# Patient Record
Sex: MALE | Race: WHITE | NOT HISPANIC OR LATINO | Employment: OTHER
[De-identification: names, ages, dates, MRNs, and addresses within clinical notes are randomized per-mention and may not be internally consistent; named-entity substitution may affect disease eponyms.]

---

## 2017-08-15 ENCOUNTER — RECORDS - HEALTHEAST (OUTPATIENT)
Dept: ADMINISTRATIVE | Facility: OTHER | Age: 62
End: 2017-08-15

## 2017-08-17 RX ORDER — UBIDECARENONE 50 MG
100 CAPSULE ORAL DAILY
Status: SHIPPED | COMMUNITY
Start: 2017-08-17 | End: 2023-02-03

## 2017-08-17 RX ORDER — MULTIVIT WITH MINERALS/LUTEIN
1000 TABLET ORAL DAILY
Status: SHIPPED | COMMUNITY
Start: 2017-08-17 | End: 2023-02-03

## 2017-08-17 RX ORDER — ASCORBIC ACID 500 MG
500 TABLET ORAL DAILY
Status: SHIPPED | COMMUNITY
Start: 2017-08-17

## 2017-08-22 ENCOUNTER — HOSPITAL ENCOUNTER (OUTPATIENT)
Dept: NUCLEAR MEDICINE | Facility: CLINIC | Age: 62
Discharge: HOME | End: 2017-08-22
Attending: FAMILY MEDICINE

## 2017-08-22 ENCOUNTER — HOSPITAL ENCOUNTER (OUTPATIENT)
Dept: CARDIOLOGY | Facility: CLINIC | Age: 62
Discharge: HOME | End: 2017-08-22
Attending: FAMILY MEDICINE

## 2017-08-22 ENCOUNTER — COMMUNICATION - HEALTHEAST (OUTPATIENT)
Dept: TELEHEALTH | Facility: CLINIC | Age: 62
End: 2017-08-22

## 2017-08-22 DIAGNOSIS — R94.31 ABNORMAL EKG: ICD-10-CM

## 2017-08-22 LAB
CV STRESS CURRENT BP HE: NORMAL
CV STRESS CURRENT HR HE: 100
CV STRESS CURRENT HR HE: 108
CV STRESS CURRENT HR HE: 108
CV STRESS CURRENT HR HE: 55
CV STRESS CURRENT HR HE: 58
CV STRESS CURRENT HR HE: 60
CV STRESS CURRENT HR HE: 60
CV STRESS CURRENT HR HE: 61
CV STRESS CURRENT HR HE: 62
CV STRESS CURRENT HR HE: 63
CV STRESS CURRENT HR HE: 63
CV STRESS CURRENT HR HE: 65
CV STRESS CURRENT HR HE: 69
CV STRESS CURRENT HR HE: 84
CV STRESS CURRENT HR HE: 87
CV STRESS CURRENT HR HE: 88
CV STRESS CURRENT HR HE: 88
CV STRESS CURRENT HR HE: 93
CV STRESS CURRENT HR HE: 95
CV STRESS DEVIATION TIME HE: NORMAL
CV STRESS ECHO PERCENT HR HE: NORMAL
CV STRESS EXERCISE STAGE HE: NORMAL
CV STRESS FINAL RESTING BP HE: NORMAL
CV STRESS FINAL RESTING HR HE: 62
CV STRESS MAX HR HE: 107
CV STRESS MAX TREADMILL GRADE HE: 0
CV STRESS MAX TREADMILL SPEED HE: 1
CV STRESS PEAK DIA BP HE: NORMAL
CV STRESS PEAK SYS BP HE: NORMAL
CV STRESS PHASE HE: NORMAL
CV STRESS PROTOCOL HE: NORMAL
CV STRESS RESTING PT POSITION HE: NORMAL
CV STRESS RESTING PT POSITION HE: NORMAL
CV STRESS ST DEVIATION AMOUNT HE: NORMAL
CV STRESS ST DEVIATION ELEVATION HE: NORMAL
CV STRESS ST EVELATION AMOUNT HE: NORMAL
CV STRESS TEST TYPE HE: NORMAL
CV STRESS TOTAL STAGE TIME MIN 1 HE: NORMAL
NUC STRESS EJECTION FRACTION: 55 %
STRESS ECHO BASELINE BP: NORMAL
STRESS ECHO BASELINE HR: 55
STRESS ECHO CALCULATED PERCENT HR: 67 %
STRESS ECHO LAST STRESS BP: NORMAL
STRESS ECHO LAST STRESS HR: 88

## 2018-04-16 ASSESSMENT — MIFFLIN-ST. JEOR
SCORE: 1939.31
SCORE: 1939.31

## 2018-04-17 ENCOUNTER — SURGERY - HEALTHEAST (OUTPATIENT)
Dept: CARDIOLOGY | Facility: CLINIC | Age: 63
End: 2018-04-17

## 2018-04-17 ASSESSMENT — MIFFLIN-ST. JEOR
SCORE: 1921.62
SCORE: 1921.62
SCORE: 1921.17
SCORE: 1921.17

## 2018-04-18 ASSESSMENT — MIFFLIN-ST. JEOR
SCORE: 1897.58
SCORE: 1897.58

## 2018-04-19 ASSESSMENT — MIFFLIN-ST. JEOR
SCORE: 1895.76
SCORE: 1895.76

## 2018-04-20 ENCOUNTER — ANESTHESIA - HEALTHEAST (OUTPATIENT)
Dept: SURGERY | Facility: CLINIC | Age: 63
End: 2018-04-20

## 2018-04-20 ASSESSMENT — MIFFLIN-ST. JEOR
SCORE: 1892.14
SCORE: 1892.14

## 2018-04-21 ASSESSMENT — MIFFLIN-ST. JEOR
SCORE: 1897.12
SCORE: 1897.12

## 2018-04-22 ASSESSMENT — MIFFLIN-ST. JEOR
SCORE: 1890.32
SCORE: 1890.32

## 2018-04-23 ENCOUNTER — SURGERY - HEALTHEAST (OUTPATIENT)
Dept: SURGERY | Facility: CLINIC | Age: 63
End: 2018-04-23

## 2018-04-23 ASSESSMENT — MIFFLIN-ST. JEOR
SCORE: 1892.59
SCORE: 1892.59

## 2018-04-24 ASSESSMENT — MIFFLIN-ST. JEOR
SCORE: 1967.89
SCORE: 1967.89

## 2018-04-25 ENCOUNTER — AMBULATORY - HEALTHEAST (OUTPATIENT)
Dept: CARDIAC REHAB | Facility: CLINIC | Age: 63
End: 2018-04-25

## 2018-04-25 DIAGNOSIS — Z95.1 S/P CABG X 5: ICD-10-CM

## 2018-04-25 ASSESSMENT — MIFFLIN-ST. JEOR
SCORE: 1980.13
SCORE: 1980.13

## 2018-04-26 ASSESSMENT — MIFFLIN-ST. JEOR
SCORE: 1942.03
SCORE: 1942.03

## 2018-04-27 ASSESSMENT — MIFFLIN-ST. JEOR
SCORE: 1920.26
SCORE: 1920.26

## 2018-04-30 ENCOUNTER — OFFICE VISIT - HEALTHEAST (OUTPATIENT)
Dept: GERIATRICS | Facility: CLINIC | Age: 63
End: 2018-04-30

## 2018-04-30 DIAGNOSIS — I21.4 NSTEMI (NON-ST ELEVATED MYOCARDIAL INFARCTION) (H): ICD-10-CM

## 2018-04-30 DIAGNOSIS — D62 ACUTE BLOOD LOSS ANEMIA: ICD-10-CM

## 2018-04-30 DIAGNOSIS — I25.110 CORONARY ARTERY DISEASE INVOLVING NATIVE CORONARY ARTERY OF NATIVE HEART WITH UNSTABLE ANGINA PECTORIS (H): ICD-10-CM

## 2018-05-03 ENCOUNTER — RECORDS - HEALTHEAST (OUTPATIENT)
Dept: LAB | Facility: CLINIC | Age: 63
End: 2018-05-03

## 2018-05-03 ENCOUNTER — AMBULATORY - HEALTHEAST (OUTPATIENT)
Dept: GERIATRICS | Facility: CLINIC | Age: 63
End: 2018-05-03

## 2018-05-04 ENCOUNTER — OFFICE VISIT - HEALTHEAST (OUTPATIENT)
Dept: GERIATRICS | Facility: CLINIC | Age: 63
End: 2018-05-04

## 2018-05-04 ENCOUNTER — OFFICE VISIT - HEALTHEAST (OUTPATIENT)
Dept: CARDIOLOGY | Facility: CLINIC | Age: 63
End: 2018-05-04

## 2018-05-04 DIAGNOSIS — Z09 FOLLOW UP: ICD-10-CM

## 2018-05-04 DIAGNOSIS — I47.29 NSVT (NONSUSTAINED VENTRICULAR TACHYCARDIA) (H): ICD-10-CM

## 2018-05-04 DIAGNOSIS — Z95.1 S/P CABG X 5: ICD-10-CM

## 2018-05-04 DIAGNOSIS — I21.4 NSTEMI (NON-ST ELEVATED MYOCARDIAL INFARCTION) (H): ICD-10-CM

## 2018-05-04 LAB
ALBUMIN SERPL-MCNC: 3.3 G/DL (ref 3.5–5)
ALP SERPL-CCNC: 127 U/L (ref 45–120)
ALT SERPL W P-5'-P-CCNC: 43 U/L (ref 0–45)
ANION GAP SERPL CALCULATED.3IONS-SCNC: 9 MMOL/L (ref 5–18)
AST SERPL W P-5'-P-CCNC: 28 U/L (ref 0–40)
BILIRUB SERPL-MCNC: 1.4 MG/DL (ref 0–1)
BUN SERPL-MCNC: 16 MG/DL (ref 8–22)
CALCIUM SERPL-MCNC: 9.6 MG/DL (ref 8.5–10.5)
CHLORIDE BLD-SCNC: 106 MMOL/L (ref 98–107)
CO2 SERPL-SCNC: 26 MMOL/L (ref 22–31)
CREAT SERPL-MCNC: 0.82 MG/DL (ref 0.7–1.3)
GFR SERPL CREATININE-BSD FRML MDRD: >60 ML/MIN/1.73M2
GLUCOSE BLD-MCNC: 101 MG/DL (ref 70–125)
HGB BLD-MCNC: 11.4 G/DL (ref 14–18)
POTASSIUM BLD-SCNC: 4.4 MMOL/L (ref 3.5–5)
PROT SERPL-MCNC: 7.2 G/DL (ref 6–8)
SODIUM SERPL-SCNC: 141 MMOL/L (ref 136–145)

## 2018-05-04 ASSESSMENT — MIFFLIN-ST. JEOR: SCORE: 1887.15

## 2018-05-08 ENCOUNTER — AMBULATORY - HEALTHEAST (OUTPATIENT)
Dept: GERIATRICS | Facility: CLINIC | Age: 63
End: 2018-05-08

## 2018-05-30 ENCOUNTER — AMBULATORY - HEALTHEAST (OUTPATIENT)
Dept: CARDIAC REHAB | Facility: CLINIC | Age: 63
End: 2018-05-30

## 2018-05-30 DIAGNOSIS — Z95.1 S/P CABG X 5: ICD-10-CM

## 2018-06-01 ENCOUNTER — AMBULATORY - HEALTHEAST (OUTPATIENT)
Dept: CARDIAC REHAB | Facility: CLINIC | Age: 63
End: 2018-06-01

## 2018-06-01 DIAGNOSIS — Z95.1 S/P CABG X 5: ICD-10-CM

## 2018-06-04 ENCOUNTER — AMBULATORY - HEALTHEAST (OUTPATIENT)
Dept: CARDIAC REHAB | Facility: CLINIC | Age: 63
End: 2018-06-04

## 2018-06-04 DIAGNOSIS — Z95.1 S/P CABG X 5: ICD-10-CM

## 2018-06-06 ENCOUNTER — RECORDS - HEALTHEAST (OUTPATIENT)
Dept: ADMINISTRATIVE | Facility: OTHER | Age: 63
End: 2018-06-06

## 2018-06-06 ENCOUNTER — AMBULATORY - HEALTHEAST (OUTPATIENT)
Dept: CARDIAC REHAB | Facility: CLINIC | Age: 63
End: 2018-06-06

## 2018-06-06 DIAGNOSIS — Z95.1 S/P CABG X 5: ICD-10-CM

## 2018-06-07 ENCOUNTER — COMMUNICATION - HEALTHEAST (OUTPATIENT)
Dept: CARDIOLOGY | Facility: CLINIC | Age: 63
End: 2018-06-07

## 2018-06-08 ENCOUNTER — AMBULATORY - HEALTHEAST (OUTPATIENT)
Dept: CARDIAC REHAB | Facility: CLINIC | Age: 63
End: 2018-06-08

## 2018-06-08 DIAGNOSIS — Z95.1 S/P CABG X 5: ICD-10-CM

## 2018-06-11 ENCOUNTER — OFFICE VISIT - HEALTHEAST (OUTPATIENT)
Dept: CARDIOLOGY | Facility: CLINIC | Age: 63
End: 2018-06-11

## 2018-06-11 ENCOUNTER — AMBULATORY - HEALTHEAST (OUTPATIENT)
Dept: CARDIAC REHAB | Facility: CLINIC | Age: 63
End: 2018-06-11

## 2018-06-11 DIAGNOSIS — Z95.1 S/P CABG X 5: ICD-10-CM

## 2018-06-11 DIAGNOSIS — I25.10 CORONARY ARTERY DISEASE INVOLVING NATIVE CORONARY ARTERY OF NATIVE HEART WITHOUT ANGINA PECTORIS: ICD-10-CM

## 2018-06-11 ASSESSMENT — MIFFLIN-ST. JEOR: SCORE: 1871.27

## 2018-06-13 ENCOUNTER — AMBULATORY - HEALTHEAST (OUTPATIENT)
Dept: CARDIAC REHAB | Facility: CLINIC | Age: 63
End: 2018-06-13

## 2018-06-13 DIAGNOSIS — Z95.1 S/P CABG X 5: ICD-10-CM

## 2018-06-15 ENCOUNTER — AMBULATORY - HEALTHEAST (OUTPATIENT)
Dept: CARDIAC REHAB | Facility: CLINIC | Age: 63
End: 2018-06-15

## 2018-06-15 ENCOUNTER — RECORDS - HEALTHEAST (OUTPATIENT)
Dept: LAB | Facility: CLINIC | Age: 63
End: 2018-06-15

## 2018-06-15 DIAGNOSIS — Z95.1 S/P CABG X 5: ICD-10-CM

## 2018-06-15 LAB — PSA SERPL-MCNC: 0.5 NG/ML (ref 0–4.5)

## 2018-06-18 ENCOUNTER — AMBULATORY - HEALTHEAST (OUTPATIENT)
Dept: CARDIAC REHAB | Facility: CLINIC | Age: 63
End: 2018-06-18

## 2018-06-18 DIAGNOSIS — Z95.1 S/P CABG X 5: ICD-10-CM

## 2018-06-20 ENCOUNTER — AMBULATORY - HEALTHEAST (OUTPATIENT)
Dept: CARDIAC REHAB | Facility: CLINIC | Age: 63
End: 2018-06-20

## 2018-06-20 DIAGNOSIS — Z95.1 S/P CABG X 5: ICD-10-CM

## 2018-06-22 ENCOUNTER — AMBULATORY - HEALTHEAST (OUTPATIENT)
Dept: CARDIAC REHAB | Facility: CLINIC | Age: 63
End: 2018-06-22

## 2018-06-22 DIAGNOSIS — Z95.1 S/P CABG X 5: ICD-10-CM

## 2018-06-25 ENCOUNTER — AMBULATORY - HEALTHEAST (OUTPATIENT)
Dept: CARDIAC REHAB | Facility: CLINIC | Age: 63
End: 2018-06-25

## 2018-06-25 DIAGNOSIS — Z95.1 S/P CABG X 5: ICD-10-CM

## 2018-06-27 ENCOUNTER — AMBULATORY - HEALTHEAST (OUTPATIENT)
Dept: CARDIAC REHAB | Facility: CLINIC | Age: 63
End: 2018-06-27

## 2018-06-27 DIAGNOSIS — Z95.1 S/P CABG X 5: ICD-10-CM

## 2018-06-29 ENCOUNTER — AMBULATORY - HEALTHEAST (OUTPATIENT)
Dept: CARDIAC REHAB | Facility: CLINIC | Age: 63
End: 2018-06-29

## 2018-06-29 DIAGNOSIS — Z95.1 S/P CABG X 5: ICD-10-CM

## 2018-07-02 ENCOUNTER — AMBULATORY - HEALTHEAST (OUTPATIENT)
Dept: CARDIAC REHAB | Facility: CLINIC | Age: 63
End: 2018-07-02

## 2018-07-02 DIAGNOSIS — Z95.1 S/P CABG X 5: ICD-10-CM

## 2018-07-06 ENCOUNTER — AMBULATORY - HEALTHEAST (OUTPATIENT)
Dept: CARDIAC REHAB | Facility: CLINIC | Age: 63
End: 2018-07-06

## 2018-07-06 DIAGNOSIS — Z95.1 S/P CABG X 5: ICD-10-CM

## 2018-07-09 ENCOUNTER — AMBULATORY - HEALTHEAST (OUTPATIENT)
Dept: CARDIAC REHAB | Facility: CLINIC | Age: 63
End: 2018-07-09

## 2018-07-09 DIAGNOSIS — Z95.1 S/P CABG X 5: ICD-10-CM

## 2018-07-11 ENCOUNTER — AMBULATORY - HEALTHEAST (OUTPATIENT)
Dept: CARDIAC REHAB | Facility: CLINIC | Age: 63
End: 2018-07-11

## 2018-07-11 DIAGNOSIS — Z95.1 S/P CABG X 5: ICD-10-CM

## 2018-07-16 ENCOUNTER — AMBULATORY - HEALTHEAST (OUTPATIENT)
Dept: CARDIAC REHAB | Facility: CLINIC | Age: 63
End: 2018-07-16

## 2018-07-16 DIAGNOSIS — Z95.1 S/P CABG X 5: ICD-10-CM

## 2018-07-18 ENCOUNTER — AMBULATORY - HEALTHEAST (OUTPATIENT)
Dept: CARDIAC REHAB | Facility: CLINIC | Age: 63
End: 2018-07-18

## 2018-07-18 DIAGNOSIS — Z95.1 S/P CABG X 5: ICD-10-CM

## 2018-07-20 ENCOUNTER — AMBULATORY - HEALTHEAST (OUTPATIENT)
Dept: CARDIAC REHAB | Facility: CLINIC | Age: 63
End: 2018-07-20

## 2018-07-20 DIAGNOSIS — Z95.1 S/P CABG X 5: ICD-10-CM

## 2018-07-23 ENCOUNTER — AMBULATORY - HEALTHEAST (OUTPATIENT)
Dept: CARDIAC REHAB | Facility: CLINIC | Age: 63
End: 2018-07-23

## 2018-07-23 DIAGNOSIS — Z95.1 S/P CABG X 5: ICD-10-CM

## 2018-07-25 ENCOUNTER — AMBULATORY - HEALTHEAST (OUTPATIENT)
Dept: CARDIAC REHAB | Facility: CLINIC | Age: 63
End: 2018-07-25

## 2018-07-25 DIAGNOSIS — Z95.1 S/P CABG X 5: ICD-10-CM

## 2018-07-27 ENCOUNTER — AMBULATORY - HEALTHEAST (OUTPATIENT)
Dept: CARDIAC REHAB | Facility: CLINIC | Age: 63
End: 2018-07-27

## 2018-07-27 DIAGNOSIS — Z95.1 S/P CABG X 5: ICD-10-CM

## 2018-09-19 ENCOUNTER — COMMUNICATION - HEALTHEAST (OUTPATIENT)
Dept: ADMINISTRATIVE | Facility: CLINIC | Age: 63
End: 2018-09-19

## 2018-11-29 ENCOUNTER — RECORDS - HEALTHEAST (OUTPATIENT)
Dept: ADMINISTRATIVE | Facility: OTHER | Age: 63
End: 2018-11-29

## 2018-11-29 ENCOUNTER — AMBULATORY - HEALTHEAST (OUTPATIENT)
Dept: CARDIOLOGY | Facility: CLINIC | Age: 63
End: 2018-11-29

## 2018-12-04 ENCOUNTER — OFFICE VISIT - HEALTHEAST (OUTPATIENT)
Dept: CARDIOLOGY | Facility: CLINIC | Age: 63
End: 2018-12-04

## 2018-12-04 DIAGNOSIS — I25.10 CORONARY ARTERY DISEASE INVOLVING NATIVE CORONARY ARTERY OF NATIVE HEART WITHOUT ANGINA PECTORIS: ICD-10-CM

## 2018-12-04 DIAGNOSIS — I47.29 NSVT (NONSUSTAINED VENTRICULAR TACHYCARDIA) (H): ICD-10-CM

## 2018-12-04 DIAGNOSIS — Z95.1 HX OF CABG: ICD-10-CM

## 2018-12-04 ASSESSMENT — MIFFLIN-ST. JEOR: SCORE: 1912.09

## 2018-12-13 ENCOUNTER — RECORDS - HEALTHEAST (OUTPATIENT)
Dept: LAB | Facility: CLINIC | Age: 63
End: 2018-12-13

## 2018-12-13 LAB
ANION GAP SERPL CALCULATED.3IONS-SCNC: 5 MMOL/L (ref 5–18)
BUN SERPL-MCNC: 24 MG/DL (ref 8–22)
CALCIUM SERPL-MCNC: 9.7 MG/DL (ref 8.5–10.5)
CHLORIDE BLD-SCNC: 107 MMOL/L (ref 98–107)
CHOLEST SERPL-MCNC: 141 MG/DL
CO2 SERPL-SCNC: 28 MMOL/L (ref 22–31)
CREAT SERPL-MCNC: 0.74 MG/DL (ref 0.7–1.3)
FASTING STATUS PATIENT QL REPORTED: NORMAL
GFR SERPL CREATININE-BSD FRML MDRD: >60 ML/MIN/1.73M2
GLUCOSE BLD-MCNC: 94 MG/DL (ref 70–125)
HDLC SERPL-MCNC: 49 MG/DL
LDLC SERPL CALC-MCNC: 79 MG/DL
POTASSIUM BLD-SCNC: 5.1 MMOL/L (ref 3.5–5)
SODIUM SERPL-SCNC: 140 MMOL/L (ref 136–145)
TRIGL SERPL-MCNC: 66 MG/DL

## 2019-06-13 ENCOUNTER — RECORDS - HEALTHEAST (OUTPATIENT)
Dept: LAB | Facility: CLINIC | Age: 64
End: 2019-06-13

## 2019-06-13 LAB
ALBUMIN SERPL-MCNC: 3.7 G/DL (ref 3.5–5)
ALP SERPL-CCNC: 66 U/L (ref 45–120)
ALT SERPL W P-5'-P-CCNC: 26 U/L (ref 0–45)
ANION GAP SERPL CALCULATED.3IONS-SCNC: 7 MMOL/L (ref 5–18)
AST SERPL W P-5'-P-CCNC: 27 U/L (ref 0–40)
BILIRUB SERPL-MCNC: 0.8 MG/DL (ref 0–1)
BUN SERPL-MCNC: 14 MG/DL (ref 8–22)
CALCIUM SERPL-MCNC: 9.7 MG/DL (ref 8.5–10.5)
CHLORIDE BLD-SCNC: 107 MMOL/L (ref 98–107)
CHOLEST SERPL-MCNC: 133 MG/DL
CO2 SERPL-SCNC: 27 MMOL/L (ref 22–31)
CREAT SERPL-MCNC: 0.76 MG/DL (ref 0.7–1.3)
FASTING STATUS PATIENT QL REPORTED: NORMAL
GFR SERPL CREATININE-BSD FRML MDRD: >60 ML/MIN/1.73M2
GLUCOSE BLD-MCNC: 91 MG/DL (ref 70–125)
HDLC SERPL-MCNC: 40 MG/DL
LDLC SERPL CALC-MCNC: 83 MG/DL
POTASSIUM BLD-SCNC: 4.6 MMOL/L (ref 3.5–5)
PROT SERPL-MCNC: 6.3 G/DL (ref 6–8)
PSA SERPL-MCNC: 0.5 NG/ML (ref 0–4.5)
SODIUM SERPL-SCNC: 141 MMOL/L (ref 136–145)
TRIGL SERPL-MCNC: 52 MG/DL

## 2020-03-18 ENCOUNTER — AMBULATORY - HEALTHEAST (OUTPATIENT)
Dept: CARDIOLOGY | Facility: CLINIC | Age: 65
End: 2020-03-18

## 2020-03-18 ENCOUNTER — RECORDS - HEALTHEAST (OUTPATIENT)
Dept: ADMINISTRATIVE | Facility: OTHER | Age: 65
End: 2020-03-18

## 2020-03-20 ENCOUNTER — OFFICE VISIT - HEALTHEAST (OUTPATIENT)
Dept: CARDIOLOGY | Facility: CLINIC | Age: 65
End: 2020-03-20

## 2020-03-20 DIAGNOSIS — I47.29 NSVT (NONSUSTAINED VENTRICULAR TACHYCARDIA) (H): ICD-10-CM

## 2020-03-20 DIAGNOSIS — Z95.1 HX OF CABG: ICD-10-CM

## 2020-03-20 DIAGNOSIS — I25.10 CORONARY ARTERY DISEASE INVOLVING NATIVE CORONARY ARTERY OF NATIVE HEART WITHOUT ANGINA PECTORIS: ICD-10-CM

## 2020-03-20 DIAGNOSIS — I10 ESSENTIAL HYPERTENSION: ICD-10-CM

## 2020-07-01 ENCOUNTER — RECORDS - HEALTHEAST (OUTPATIENT)
Dept: LAB | Facility: CLINIC | Age: 65
End: 2020-07-01

## 2020-07-01 LAB
ALBUMIN SERPL-MCNC: 3.9 G/DL (ref 3.5–5)
ALP SERPL-CCNC: 76 U/L (ref 45–120)
ALT SERPL W P-5'-P-CCNC: 25 U/L (ref 0–45)
ANION GAP SERPL CALCULATED.3IONS-SCNC: 9 MMOL/L (ref 5–18)
AST SERPL W P-5'-P-CCNC: 23 U/L (ref 0–40)
BILIRUB SERPL-MCNC: 1 MG/DL (ref 0–1)
BUN SERPL-MCNC: 17 MG/DL (ref 8–22)
CALCIUM SERPL-MCNC: 9.7 MG/DL (ref 8.5–10.5)
CHLORIDE BLD-SCNC: 105 MMOL/L (ref 98–107)
CHOLEST SERPL-MCNC: 140 MG/DL
CO2 SERPL-SCNC: 27 MMOL/L (ref 22–31)
CREAT SERPL-MCNC: 0.85 MG/DL (ref 0.7–1.3)
FASTING STATUS PATIENT QL REPORTED: NORMAL
GFR SERPL CREATININE-BSD FRML MDRD: >60 ML/MIN/1.73M2
GLUCOSE BLD-MCNC: 102 MG/DL (ref 70–125)
HDLC SERPL-MCNC: 43 MG/DL
LDLC SERPL CALC-MCNC: 87 MG/DL
POTASSIUM BLD-SCNC: 4.6 MMOL/L (ref 3.5–5)
PROT SERPL-MCNC: 6.6 G/DL (ref 6–8)
PSA SERPL-MCNC: 0.6 NG/ML (ref 0–4.5)
SODIUM SERPL-SCNC: 141 MMOL/L (ref 136–145)
TRIGL SERPL-MCNC: 51 MG/DL

## 2021-01-19 ENCOUNTER — RECORDS - HEALTHEAST (OUTPATIENT)
Dept: ADMINISTRATIVE | Facility: OTHER | Age: 66
End: 2021-01-19

## 2021-01-19 ENCOUNTER — AMBULATORY - HEALTHEAST (OUTPATIENT)
Dept: CARDIOLOGY | Facility: CLINIC | Age: 66
End: 2021-01-19

## 2021-01-20 ENCOUNTER — COMMUNICATION - RIVER FALLS (OUTPATIENT)
Dept: FAMILY MEDICINE | Facility: CLINIC | Age: 66
End: 2021-01-20

## 2021-01-20 ENCOUNTER — OFFICE VISIT - HEALTHEAST (OUTPATIENT)
Dept: CARDIOLOGY | Facility: TELEHEALTH | Age: 66
End: 2021-01-20

## 2021-01-20 DIAGNOSIS — I25.10 CORONARY ARTERY DISEASE INVOLVING NATIVE CORONARY ARTERY OF NATIVE HEART WITHOUT ANGINA PECTORIS: ICD-10-CM

## 2021-01-20 DIAGNOSIS — I47.29 NSVT (NONSUSTAINED VENTRICULAR TACHYCARDIA) (H): ICD-10-CM

## 2021-01-20 DIAGNOSIS — I10 ESSENTIAL HYPERTENSION: ICD-10-CM

## 2021-01-20 DIAGNOSIS — E78.2 MIXED HYPERLIPIDEMIA: ICD-10-CM

## 2021-01-20 DIAGNOSIS — Z95.1 HX OF CABG: ICD-10-CM

## 2021-02-17 ENCOUNTER — OFFICE VISIT - HEALTHEAST (OUTPATIENT)
Dept: CARDIOLOGY | Facility: CLINIC | Age: 66
End: 2021-02-17

## 2021-02-17 DIAGNOSIS — I47.29 NSVT (NONSUSTAINED VENTRICULAR TACHYCARDIA) (H): ICD-10-CM

## 2021-02-19 ENCOUNTER — OFFICE VISIT - HEALTHEAST (OUTPATIENT)
Dept: CARDIOLOGY | Facility: CLINIC | Age: 66
End: 2021-02-19

## 2021-02-19 DIAGNOSIS — I47.29 NSVT (NONSUSTAINED VENTRICULAR TACHYCARDIA) (H): ICD-10-CM

## 2021-02-26 ENCOUNTER — OFFICE VISIT - HEALTHEAST (OUTPATIENT)
Dept: CARDIOLOGY | Facility: CLINIC | Age: 66
End: 2021-02-26

## 2021-02-26 DIAGNOSIS — I25.10 CORONARY ARTERY DISEASE INVOLVING NATIVE CORONARY ARTERY OF NATIVE HEART WITHOUT ANGINA PECTORIS: ICD-10-CM

## 2021-03-02 ENCOUNTER — OFFICE VISIT - HEALTHEAST (OUTPATIENT)
Dept: CARDIOLOGY | Facility: CLINIC | Age: 66
End: 2021-03-02

## 2021-03-02 DIAGNOSIS — L23.1 CONTACT DERMATITIS DUE TO ADHESIVES, UNSPECIFIED CONTACT DERMATITIS TYPE: ICD-10-CM

## 2021-03-09 ENCOUNTER — OFFICE VISIT - HEALTHEAST (OUTPATIENT)
Dept: CARDIOLOGY | Facility: CLINIC | Age: 66
End: 2021-03-09

## 2021-03-09 DIAGNOSIS — L23.1 CONTACT DERMATITIS DUE TO ADHESIVES, UNSPECIFIED CONTACT DERMATITIS TYPE: ICD-10-CM

## 2021-04-05 ENCOUNTER — AMBULATORY - HEALTHEAST (OUTPATIENT)
Dept: NURSING | Facility: CLINIC | Age: 66
End: 2021-04-05

## 2021-04-26 ENCOUNTER — AMBULATORY - HEALTHEAST (OUTPATIENT)
Dept: NURSING | Facility: CLINIC | Age: 66
End: 2021-04-26

## 2021-06-01 VITALS — BODY MASS INDEX: 26.41 KG/M2 | WEIGHT: 217 LBS

## 2021-06-01 VITALS — BODY MASS INDEX: 26.29 KG/M2 | WEIGHT: 216 LBS

## 2021-06-01 VITALS
WEIGHT: 226.8 LBS | HEIGHT: 76 IN | HEIGHT: 76 IN | WEIGHT: 226.8 LBS | BODY MASS INDEX: 27.62 KG/M2 | BODY MASS INDEX: 27.62 KG/M2

## 2021-06-01 VITALS — BODY MASS INDEX: 26.17 KG/M2 | WEIGHT: 215 LBS

## 2021-06-01 VITALS — WEIGHT: 218 LBS | BODY MASS INDEX: 26.54 KG/M2

## 2021-06-01 VITALS — WEIGHT: 214.8 LBS | BODY MASS INDEX: 26.15 KG/M2

## 2021-06-01 VITALS — WEIGHT: 215 LBS | BODY MASS INDEX: 26.17 KG/M2

## 2021-06-01 VITALS — WEIGHT: 216 LBS | BODY MASS INDEX: 26.29 KG/M2

## 2021-06-01 VITALS — WEIGHT: 213 LBS | BODY MASS INDEX: 25.93 KG/M2

## 2021-06-01 VITALS — HEIGHT: 76 IN | BODY MASS INDEX: 26.73 KG/M2 | WEIGHT: 219.5 LBS

## 2021-06-01 VITALS — WEIGHT: 217 LBS | BODY MASS INDEX: 26.41 KG/M2

## 2021-06-01 VITALS — WEIGHT: 216 LBS | HEIGHT: 76 IN | BODY MASS INDEX: 26.3 KG/M2

## 2021-06-02 VITALS — WEIGHT: 225 LBS | HEIGHT: 76 IN | BODY MASS INDEX: 27.4 KG/M2

## 2021-06-04 VITALS — BODY MASS INDEX: 27.51 KG/M2 | WEIGHT: 226 LBS | SYSTOLIC BLOOD PRESSURE: 116 MMHG | DIASTOLIC BLOOD PRESSURE: 62 MMHG

## 2021-06-15 NOTE — PROGRESS NOTES
"Study Purpose: Atrial Fibrillation Algorithm Clinical Validation Study, prospective, multi-center, non-significant risk      Date Adverse Event Occurred: 3/2/2021      Adverse Event Name: Contact Dermatitis     Adverse Event Details: Patient removed Parrot Patch per protocol note increased redness of the perimeter where the patch had been placed and light erythema with in the patch. During this visit on 3/2/202, patient denied itching or irritation however he noted the \"raised red boarders, that's not good\" where the patch had been placed and today he states that it has completely gone away except one small area.      NP noted a scab of 1 cm by 5 mm dry on the perimeter were the Parrot Patch had been placed     Treatment:   Wash left upper chest with mild soap with warm water such as CeraVe or Ivory and cleanse area gentle.  Then pat dry and apply Hydrocortisone 1 % over the counter three times a day  Monitor for signs and symptoms of infection such as increased redness, of if drainage, increased warm, increased pain or fever, chills, or bodyaches and if these symptoms occur to notify research office.  NP gave patient option of follow up in one week or if area is not completely resolved then he could call clinic in one week.  Patient chose the later option.        Adverse Event Status: On going stablized   Resolved / Ongoing or stabilized and followed by private MD / Subject Lost to follow up / Unresolvable       "

## 2021-06-16 PROBLEM — I25.10 CAD (CORONARY ARTERY DISEASE), NATIVE CORONARY ARTERY: Status: ACTIVE | Noted: 2018-04-23

## 2021-06-16 PROBLEM — J96.00 ARF (ACUTE RESPIRATORY FAILURE) (H): Status: ACTIVE | Noted: 2018-04-23

## 2021-06-16 PROBLEM — D62 ACUTE BLOOD LOSS ANEMIA: Status: ACTIVE | Noted: 2018-04-23

## 2021-06-16 PROBLEM — R07.9 CHEST PAIN: Status: ACTIVE | Noted: 2018-04-16

## 2021-06-16 PROBLEM — I21.4 NSTEMI (NON-ST ELEVATED MYOCARDIAL INFARCTION) (H): Status: ACTIVE | Noted: 2018-04-16

## 2021-06-16 PROBLEM — D68.9 COAGULOPATHY (H): Status: ACTIVE | Noted: 2018-04-23

## 2021-06-16 PROBLEM — Z95.1 HX OF CABG: Status: ACTIVE | Noted: 2018-12-04

## 2021-06-16 PROBLEM — R73.9 HYPERGLYCEMIA: Status: ACTIVE | Noted: 2018-04-23

## 2021-06-17 NOTE — ANESTHESIA PROCEDURE NOTES
Arterial Line  Reason for Procedure: hemodynamic monitoring  Patient location during procedure: OR pre-induction  Start time: 4/23/2018 7:00 AM  End time: 4/23/2018 7:10 AM  Staffing:  Performing  Anesthesiologist: JOHAN MENDEZ  Performing CRNA: LADAN PALUMBO  Sterile Precautions:  sterile barriers used during insertion: cap, mask, sterile gloves, large sheet, and hand hygiene used.  Arterial Line:   Immediately prior to procedure a time out was called to verify the correct patient, procedure, equipment, support staff and site/side marked as required  Laterality: right  Location: radial  Prepped with: ChloroPrep    Needle gauge: 20 G  Number of Attempts: 1  Secured with: tape, transparent dressing and pressure dressing  Flushed with: saline  1% lidocaine local anesthesia used for skin prep.   See MAR for additional medications given.  Ultrasound evaluation of access site: yes  Vessel patent by US exam    Concurrent real time visualization of needle entry

## 2021-06-17 NOTE — ANESTHESIA PROCEDURE NOTES
MARYBEL    Patient location during procedure: OR  Start time: 4/23/2018 7:45 AM  Staffing:  Performing  Anesthesiologist: JOHAN MENDEZ  MARYBEL:  Type/Reason: Monitoring MARYBEL  Technique: blind insertion  Difficulty: easy  Anesthesia Monitoring: see additional note

## 2021-06-17 NOTE — PROGRESS NOTES
[facility]    Facility:   [facility] [[id number]]   Code Status: POLST AVAILABLE    Assessment and discharge planning of 63-year-old male who presented to hospital with complaints of sustained anterior chest discomfort and upper back discomfort, diagnosed with non-ST elevation myocardial infarction, emergent coronary angiogram disclosed multivessel coronary artery disease, underwent CABG times 5 on 4/23, postoperative course complicated with blood loss anemia, received two units packed red blood cells and six units fresh frozen plasma, nonsustained ventricular tachycardia resolved. Initiated on beta blocker, transferred to TCU for rehabilitation and management of medical problems. Patient is completing course of physical therapy, recent discontinuation of Norvasc with persistent blood pressure less than 90, metoprolol decreased to 12.5 mg BID.    Review of systems: strength gradually improving. Denies recurrent chest discomfort. No palpitations cough sputum production orthopnea or PND. Pain left medial thigh and lower extremity, resolving ecchymoses. Anticipates flying to [place] in 72 hours, questions advisability of light. No active G.I. or  symptoms. Denies lightheadedness with standing. Remainder of 12 point review of systems obtained negative.    Exam: patient alert and oriented times three, sitting in chair, blood pressure 99/66, 02 93%, pulse 88, respiratory 16, temperature 98.2, cognitively intact. In no apparent distress. Mucous membranes moist. Thyroid without nodularity. No carotid bruits or HJR. S1 and S2 regular with faint systolic murmur. Pulmonary exam with minimal dry crackles extreme right lower lung base, no wheezes or rales. Sternotomy site with wound edges well approximated, resolving hematoma epigastrium, stab wounds upper abdomen with wound edges well approximated, no drainage or surrounding erythema. Liver and spleen without enlargement.  Ecchymoses involving entire left medial thigh and extending into left lower extremity, trace nonpitting edema left greater than right lower extremity. Varicosities bilateral lower extremities. No calf tenderness or swelling. No hand drift.    Impression and plan:   status post CABG x5, on statin, persistent decrease in blood pressure, Norvasc has been discontinued, on metoprolol 12.5 b.i.d., increase in pulse to approximately 110 with exercise, will require cardiac rehab, chronic deconditioning reviewed.   Ecchymoses left medial thigh and lower extremity, no evidence of hematoma or compartmental syndrome.   Hyperlipidemia on statin.   Chronic deconditioning with need for rehabilitation.   Recent acute blood loss anemia, hemoglobin satisfactory, electrolytes sodium 141, potassium 4.4, chloride 106, 02 to 6, creatinine 0.82, GFR greater than 60, total bilirubin 1.4, alkaline phosphatase 127, albumin 3.3.   Potential risk of decompressed chamber with flying post CABG reviewed with patient. Patient additionally seen by  cardiothoracic surgeon today who per patient states there is no limitation to travel by airplane.   Care plan and medications are reviewed, discharge medications as follows:  Current Outpatient Prescriptions:      acetaminophen (TYLENOL) 325 MG tablet, Take 2 tablets (650 mg total) by mouth every 6 (six) hours as needed for pain or fever., Disp: , Rfl: 0     ascorbic acid, vitamin C, (ASCORBIC ACID WITH JUNIOR HIPS) 500 MG tablet, Take 500 mg by mouth daily., Disp: , Rfl:      aspirin 81 mg chewable tablet, Chew 1 tablet (81 mg total) daily., Disp: , Rfl: 0     atorvastatin (LIPITOR) 20 MG tablet, Take 1 tablet (20 mg total) by mouth daily., Disp: 30 tablet, Rfl: 3     b complex vitamins tablet, Take 1 tablet by mouth daily., Disp: , Rfl:      CHROMIUM ORAL, Take 500 mcg by mouth daily., Disp: , Rfl:      co-enzyme Q-10 50 mg capsule, Take 100 mg by mouth daily., Disp: , Rfl:      ginkgo biloba 60 mg cap,  Take 1 capsule by mouth daily., Disp: , Rfl:      glucosamine HCl 750 mg Tab, Take 1 tablet by mouth daily., Disp: , Rfl:      lecithin 1,200 mg cap, Take 1,200 mg by mouth daily., Disp: , Rfl:      metoprolol tartrate (LOPRESSOR) 25 MG tablet, Take 12.5 mg by mouth 2 (two) times a day. Hold for SBP<95, or AP <60., Disp: , Rfl:      MULTIVIT-MINERALS/FERROUS FUM (MULTI VITAMIN ORAL), Take 1 tablet by mouth daily. Takes antioxidant formula antioxidant multiple vitamin and minerals table orally daily, Disp: , Rfl:      omega-3 fatty acids 500 mg cap, Take 2 capsules by mouth daily., Disp: , Rfl:      polyethylene glycol (MIRALAX) 17 gram packet, Take 1 packet (17 g total) by mouth daily as needed., Disp: , Rfl: 0     vitamin E 1000 UNIT capsule, Take 1,000 Units by mouth daily., Disp: , Rfl:      VITS A,C,E/LUTEIN/MINERALS (VISION FORMULA, WITH LUTEIN, ORAL), Take 1 tablet by mouth daily., Disp: , Rfl:  patient anticipates flying to [place] in 48 hours, follow up will be with cardiologist and regular physician on return. Total time of discharge evaluation greater than 30 minutes, greater than 50% of time spent in coordination of care and counseling.      Electronically signed by: Hilary Jefferson MD

## 2021-06-17 NOTE — PROGRESS NOTES
[facility]    Facility:   [facility] [[id number]]   Code Status: POLST AVAILABLE    Admission evaluation to TCU of 62-year-old male who presented to hospital with complaints of anterior chest discomfort, posterior back discomfort with radiation to axilla, previous  history of intermittent elevation of blood pressure and mild hyperlipidemia without pharmacological treatment, sustained non-ST elevation myocardial infarction, underwent CABG times five, postoperative course complicated with blood loss anemia, received two units of packed red blood cells and six units of fresh frozen plasma, nonsustained ventricular tachycardia resolved, stabilized and transferred to TCU for rehabilitation, observation of clinical status, management of medical problems on the following medication metoprolol 25 mg BID, amlodipine 2.5 mg Q day, Lipitor 20 mg Q day.    Past medical history, social history, family history, drug allergies, current medication list, current medical problem list, code status reviewed in epic. Patient is adopted, family history is unknown.    Review of systems: generalized fatigue. Modest pain at sternotomy site. No recurrent anginal symptoms. Significant hematoma/bruising left medial thigh and left calf, mildly tender. No cognitive impairment postevent. No focal neurologic deficits. Remainder of 12 point review of systems obtained negative.    Exam: patient alert and oriented, appears stated age, cognitively intact. Blood pressure 111/72, 02 96%, pulse 92, respiratory 12, temperature 98.9. No facial asymmetry. Mucous membranes moist. No carotid bruits or HJR. Thyroid without nodularity. Sternotomy site with wound edges well approximated, small hematoma epigastrium, puncture wounds without active bleeding. Pulmonary exam with dry crackles right greater than left lung base, no wheezes or rales. S1 and S2 regular with 1/6 systolic murmur. Abdomen without liver or  spleen enlargement, no tenderness organomegaly. Extensive bruising left medial thigh and left lower extremity, no hematoma. Varicosities bilateral lower extremities. Pedal pulses intact. Muscle tone and strength diminished and cyst symmetrical.    Impression and plan:   status post CABG x5, brief nonsustained V tach without recurrence, on beta blocker and norvasc, intermittent low blood pressure recordings, monitor cautiously, no orthostatic changes, may require decrease in antihypertensives.   Chronic deconditioning with need for rehabilitation.   Hyperlipidemia on Lipitor.   Acute postoperative blood loss status post two units of packed red blood cells and six units of fresh frozen plasma.   History of left hip surgery currently asymptomatic.   Extensive bruising left lower extremity, monitor for any increase in bleeding. Hemoglobin recheck in four days.   Total time of admission evaluation greater than 45 minutes, greater than 50% of time spent in coordination of care and counseling.      Electronically signed by: Hilary Jefferson MD

## 2021-06-17 NOTE — ANESTHESIA CARE TRANSFER NOTE
Last vitals:   Vitals:    04/23/18 1552   BP: 128/61   Pulse: 78   Resp: 17   Temp: 37.6  C (99.6  F)   SpO2: 100%     Patient's level of consciousness is unresponsive  Spontaneous respirations: no: ETT in place  Maintains airway independently: no: ETT in place  Dentition unchanged: yes  Oropharynx: endotracheal tube in place    QCDR Measures:  ASA# 20 - Surgical Safety Checklist: WHO surgical safety checklist completed prior to induction  PQRS# 430 - Adult PONV Prevention: 4558F-1P - Medical reason for NOT administering combination therapy  ASA# 8 - Peds PONV Prevention: NA - Not pediatric patient, not GA or 2 or more risk factors NOT present  PQRS# 424 - Martha-op Temp Management: 4559F - At least one body temp DOCUMENTED => 35.5C or 95.9F within required timeframe  PQRS# 426 - PACU Transfer Protocol: - Transfer of care checklist used  ASA# 14 - Acute Post-op Pain: ASA14B - Patient did NOT experience pain >= 7 out of 10

## 2021-06-17 NOTE — ANESTHESIA PROCEDURE NOTES
Central line    Start time: 4/23/2018 7:30 AM  End time: 4/23/2018 7:40 AM  Patient location: OR Post-induction  Indications: central pressure monitoring and vascular access  Performing Anesthesiologist: JOHAN MENDEZ  Pre-procedure Checklist  Completed: patient identified, site marked, risks, benefits, and alternatives discussed, timeout performed, consent obtained, hand hygiene performed, all elements of maximal sterile barriers used including cap, mask, gown, sterile gloves, and large sheet and skin prep agent completely dried prior to procedure    Procedure Details:  Preparation: 2% chlorhexidine  Location details: right internal jugular  Site selection rationale: access  Catheter type: Introducer with Salvisa-Dane  Introducer type: MAC  Lumens:double lumenWedged at: 60   Withdrawn and locked at: 54Number of attempts: 1  Ultrasound evaluation of access site: yes  Vessel patent by US exam  Concurrent real time visualization of needle entry  manometry confirmation of venous access    Post-procedure:   line sutured  Assessment: blood return through all ports and free fluid flow  Complications: none

## 2021-06-17 NOTE — ANESTHESIA POSTPROCEDURE EVALUATION
Patient: Chris Redd  CORONARY ARTERY BYPASS X5, LEFT LEG ENDOSCOPIC VEIN HARVEST, LEFT INTERNAL MAMMARY ARTERY, ANESTHESIA TRANSESOPHAGEAL ECHOCARDIOGRAM  Anesthesia type: general    Patient location: ICU  Last vitals:   Vitals:    04/25/18 0350   BP: 116/69   Pulse: 86   Resp: 22   Temp: 36.8  C (98.3  F)   SpO2: 95%     Post vital signs: stable  Level of consciousness: awake and responds to simple questions  Post-anesthesia pain: pain controlled  Post-anesthesia nausea and vomiting: no  Pulmonary: unassisted  Cardiovascular: stable  Hydration: adequate  Anesthetic events: no    QCDR Measures:  ASA# 11 - Martha-op Cardiac Arrest: ASA11B - Patient did NOT experience unanticipated cardiac arrest  ASA# 12 - Martha-op Mortality Rate: ASA12B - Patient did NOT die  ASA# 13 - PACU Re-Intubation Rate: ASA13B - Patient did NOT require a new airway mgmt  ASA# 10 - Composite Anes Safety: ASA10A - No serious adverse event    Additional Notes: Doing well. Off pressors/ionotropes. Pain tolerable.

## 2021-06-17 NOTE — ANESTHESIA PREPROCEDURE EVALUATION
Anesthesia Evaluation      Patient summary reviewed   No history of anesthetic complications     Airway   Mallampati: II  Neck ROM: full   Pulmonary - negative ROS and normal exam    breath sounds clear to auscultation  (-) sleep apnea, not a smoker    ROS comment: FINDINGS: Heart size and pulmonary vascularity normal. The lungs are clear.                         Cardiovascular   Exercise tolerance: > or = 4 METS  (+) CAD, dysrhythmias, angina, ,     (-) murmur  ECG reviewed  Rhythm: regular  Rate: normal,    no murmur   ROS comment: CARDIAC CATHETERIZATION  (4/17/18):     Mid RCA lesion 100% stenosed.    Prox RCA to Mid RCA lesion 40% stenosed.    Prox LAD lesion 50% stenosed.    Dist Cx lesion 90% stenosed.    Prox Cx lesion 40% stenosed.    Mid Cx lesion 30% stenosed.    Ost LAD to Prox LAD lesion 35% stenosed.    Ost 2nd Mrg-1 lesion 60% stenosed.    Ost 2nd Mrg-2 lesion 99% stenosed.    Ost 1st Mrg lesion 40% stenosed.      IMPRESSION:   CONCLUSION:  1.  Minimal atheromatous plaque in the carotid arteries.  2.  No significant stenosis on either side.  3.  Antegrade vertebral flow bilaterally.  1. The left ventricle is normal in size. Left ventricular systolic performance is at the lower limits of normal. The ejection fraction is estimated to be 50%.    2. On selected views, there is mild relative hypokinesis of the posterolateral wall.   3. There is mild concentric increase in left ventricular wall thickness.  4. No significant valvular heart disease is identified on this study.   5. Normal right ventricular size and systolic performance     Neuro/Psych - negative ROS     Endo/Other    (+) arthritis,   (-) no diabetes, hypothyroidism, hyperthyroidism, no obesity     GI/Hepatic/Renal - negative ROS   (-) GERD, esophageal disease          Dental - normal exam   (+) bridge                       Anesthesia Plan  Planned anesthetic: general endotracheal  Methadone  Magnesium protocol  Ketamine  Early extubation in  ICU candidate  ASA 3   Induction: intravenous   Anesthetic plan and risks discussed with: patient  Anesthesia plan special considerations: antiemetics, CVP line, arterial catheterization, pulmonary artery catheterization, IV therapy two IVs, dexmedetomidine  Post-op plan: extended intubation/vent support

## 2021-06-18 NOTE — PATIENT INSTRUCTIONS - HE
Patient Instructions by Dennis Calhoun MD at 3/20/2020  8:50 AM     Author: Dennis Calhoun MD Service: -- Author Type: Physician    Filed: 3/20/2020  9:06 AM Encounter Date: 3/20/2020 Status: Signed    : Dennis Calhoun MD (Physician)       Below is a list of instructions we discussed over the phone on 3/20/2020  1. You can stop your amlodipine  2. Monitor your blood pressure 2-3 times per week and call if your blood pressure (either number) is > 130/90 mmHg  3. Get regular exercise. The more the better. There more physically fit you are, the less likely you are to have severe symptoms from any disease, especially one like Covid-19.  4. Follow up in 1 year or sooner if needed.    It was a pleasure to speak with you today.  Please do not hesitate to call the [facility] clinic with any questions or concerns at [phone].    Sincerely,

## 2021-06-18 NOTE — PROGRESS NOTES
Chris CALLEJAS Bolivararnoldo has participated in 12 sessions of Phase II Cardiac Rehab.    Progress Report:   Cardiac Rehab Treatment Progress Report 6/15/2018 6/18/2018 6/20/2018 6/22/2018 6/25/2018   Weight 215 lbs 216 lbs 215 lbs 217 lbs 216 lbs   Pre Exercise  HR 70 76 77 69 65   Pre Exercise /64 104/64 120/66 122/62 118/66   Treadmill Peak  138 - 142 107   Treadmill Peak Blood Pressure 138/80 144/74 - 148/80 140/78   Heart Rate 80 85 84 79 74   Post Exercise /66 100/50 116/60 110/54 118/70   ECG SR-ST, occ PVC's SR-ST with occ PVC's SR-ST SR-ST SR-ST   Total Exercise Minutes 35 50 55 48 46         Current Status:  Symptomatic- Since Dai 15 pt. has had episodes of dizziness, beginning in the morning when he gets out of bed, and continues on and off throughout the day in varying degrees. Today it is more pronounced. Sitting YW=158/70, lying BP=98/60, standing BP= 120/72. He notices it more with head movements or while on the treadmill.  No recent medication changes are noted. Heart rate and rhythm unchanged. He denies any other sx. Recommended to pt. he move out of positions slowly and deliberately and to call his primary M.D. if this persists.      If Physician recommends change in treatment plan, please place orders.        __________________________________________________      _____________  Signature                                                                                                  Date

## 2021-06-18 NOTE — PROGRESS NOTES
ITP ASSESSMENT   Assessment Day: 30 Day  Session Number: 12  Precautions: surgical precautions  Diagnosis: CAB  Risk Stratification: High  Referring Provider: Gato Valdes, *  EXERCISE  Exercise Assessment: Reassessment                            Exercise Plan  Goals Next 30 days  ADL'S: Pt. will retun to mowing yard, with breaks and will complete in 1 day.  Leisure: Pt. will resume biking without hills, following surgical precautions.  Work: Pt. to return to work, end of July.Date to be determined.    Education Goals: All goals in this section met  Education Goals Met: Patient can state cardiac s/s and appropriate emergency response.;Has system for taking medication.;Medication review.                        Goals Met  Initial ADL's goals met: Pt. has resumed sweeping, vacuuming and laundry without sx.  Initial Leisure goals met: Pt. has resumed light raking.  Initial Progression: Pt.has resumed many activities. He will gradually start trying more. for example he mowed a few strips of grass at a time, it took him 3-days to compleyte it.  He also has has a few episodes od dizziness which he has been dealing with his primary M.D.     Exercise Prescription  Exercise Mode: Treadmill;Bike;Nustep;Elliptical;Arm Erg.  Frequency: 3x/week  Duration: 45-55  No Data Recorded  RPE 11-14  Progression / Met level: 5-7  Resistive Training?: No    Current Exercise (mins/week): 330    Interventions  Home Exercise:  Mode: walking  Frequency: daily  Duration: 30-60 min    Education Material : Educational videos;Provide written material;Individual education and counseling;Offer educational classes    Education Completed  Exercise Education Completed: RPE;Signs and Symptoms;Medication review;Emergency Plan;Home Exercise;Warm up/cool down;Benefits of Exercise;End point of exercise;BP/HR Reponse to exercise            Exercise Follow-up/Discharge  Follow up/Discharge: Pt. is tolerating a 4-6 met level, encouraging increased home  "activity and exercise.         NUTRITION  Nutrition Assessment: Reassessment    Nutrition Risk Factors:  Nutrition Risk Factors: Dyslipidemia  Cholesterol: 189  LDL: 141  HDL: 39  Triglycerides: 47    Nutrition Plan  Interventions  Diet Consult: Completed  Other Nutrition Intervention: Diet Class;Therapist/Pt Discussion;Educational Videos;Provide with Written Material  Initial Rate Your Plate Score: 57    Education Completed  Nutrition Education Completed: Low Saturated fat diet;Weight management    Goals  Nutrition Goals (Next 30 days): Patient will lose weight;Patient can identify their risk factors for CAD    Goals Met  Nutrition Goals Met: Patient follows a low sodium diet;Patient states following a low saturated fat diet;Completed Nutritional Risk Screen;Provided Rate your Plate Survey;Reviewed Dietitian schedule    Height, Weight, and  BMI  Weight: 216 lb (98 kg)  Height: 6' 4\" (1.93 m)  BMI: 26.3    Nutrition Follow-up  Follow-up/Discharge: Pt. states he has gone well with eating more fruits and vegetables and low fat milk. He is still working on portion size. will provide support as needed.     Other Risk Factors  Other Risk Factor Assessment: Reassessment    HTN Risk Factor: NA    Pre Exercise BP: 118/66  Post Exercise BP: 118/70      Tobacco Risk Factor: Tobacco (smokeless)    Initial Use:: daily  Current Use:: 0    Tobacco Plan  Tobacco Goals  Tobacco Goals: Patient remain tobacco free    Goals Met  Tobacco Goals Met: Patient remain tobacco free    Tobacco Interventions  Tobacco Interventions: Offer class on risk factor modification;Therapist/patient discussion;Provide written material;Offer educational videos    Tobacco Education Completed  Tobacco Education Completed: Identifies triggers;Health benefits of tobacco cessation    Risk Factor Follow-up   Follow-up/Discharge: Pt. has done well with risk factor modification. States weight loss is his primary risk factor he wishes to work on.       " PSYCHOSOCIAL  Psychosocial Assessment: Reassessment     [facility] COOP Q of L Summary Score: 13    KATHY-D Score: 0    Psychosocial Risk Factor: Stress    Psychosocial Plan  Interventions  Interventions: Offer educational videos and classes;Provide written material;Individual education and counseling    Education Completed  Education Completed: Effects of stress on body;S/S of depression    Goals  Goals (Next 30 days): Improvement in [facility] COOP score;Oriented to stress management classes    Goals Met  Goals Met: Identified Support system;Identify stressors;Practicing stress management skills    Psychosocial Follow-up  Follow-up/Discharge: Pt. states he has appopriate support and coping skills.         Patient involved in Goal setting?: Yes    Signature: _____________________________________________________________    Date: __________________    Time: __________________

## 2021-06-18 NOTE — PATIENT INSTRUCTIONS - HE
Patient Instructions by Dennis Calhoun MD at 1/20/2021 11:30 AM     Author: Dennis Calhoun MD Service: -- Author Type: Physician    Filed: 1/20/2021 11:54 AM Encounter Date: 1/20/2021 Status: Signed    : Dennis Calhoun MD (Physician)       It was a pleasure to meet with you today.      Below is a summary of your visit.   1. You can stop taking your metoprolol when your current supply has been depleted.  2. Continue your aspirin and atorvastatin indefinitely  3. Try to get exercise on a daily basis. The more you do, the better it is for you.  4. Follow up with me again in 1 year or sooner     You should receive a phone call from this office informing you of test or procedure results within 3 business days of the test being performed.  If you do not hear from our office with the test results within 1 week please do not hesitate to call asking for these results.     Please do not hesitate to call the [facility] clinic with any questions or concerns at [phone]. You can also reach my nurse, Sudha, during normal business hours at [phone].    Sincerely,

## 2021-06-18 NOTE — PROGRESS NOTES
Dilip Molumby has participated in 6 sessions of Phase II Cardiac Rehab.    Progress Report:   Cardiac Rehab Treatment Progress Report 6/1/2018 6/4/2018 6/6/2018 6/8/2018 6/11/2018   Weight 213 lbs 215 lbs 215 lbs 215 lbs 216 lbs   Pre Exercise  HR 64 74 56 66 64   Pre Exercise /64 122/64 100/52 102/64 126/72   Treadmill Peak  135 138 - -   Treadmill Peak Blood Pressure - 158/64 148/80 - -   Heart Rate 82 76 78 84 74   Post Exercise BP 90/60 92/54 94/60 94/60 104/60   ECG SR SR-ST with occ PVC's SR-ST frequent PVC's. Pt asymptomatic. SR-ST occasional PVC's SR-ST with occ PVC's   Total Exercise Minutes 40.5 45 31 38 30         Current Status:  Currently exercising without complaints or symptoms. Patient is exercising at a MET level of 5.5 on the recumbent bike and 5.9 on the treadmill for a total of 30-40 minutes.    If Physician recommends change in treatment plan, please place orders.        __________________________________________________      _____________  Signature                                                                                                  Date

## 2021-06-18 NOTE — PROGRESS NOTES
CARDIOTHORACIC SURGERY FOLLOW-UP VISIT     Chris Redd   1955   [id number]      Reason for visit: Post operative clinic visit. Patient underwent CABGx5 with Dr. Valdes on 4/23/2018.    HPI: Chris Redd is a 63 y.o. year old male seen in clinic for a routine follow-up appointment after surgery. Patient has past medical history of CAD. Hospital course was unremarkable. Patient was discharged to TCU.    Patient has been doing well since discharge. Patient plans to follow up with primary care after TCU discharge. Reports that incision is healing well. Denies fevers, peripheral edema. Appetite is improving and patient is voiding without difficulty. Weight has been stable. Blood glucose levels have been normal. Pain management at this point with Tylenol only. Patient has not been attending cardiac rehab yet but does plan to do so.      PAST MEDICAL HISTORY:  Past Medical History:   Diagnosis Date     Family history of myocardial infarction     pt adopted; unknown       PAST SURGICAL HISTORY:  Past Surgical History:   Procedure Laterality Date     CORONARY ARTERY BYPASS GRAFT N/A 4/23/2018    Procedure: CORONARY ARTERY BYPASS X5, LEFT LEG ENDOSCOPIC VEIN HARVEST, LEFT INTERNAL MAMMARY ARTERY, ANESTHESIA TRANSESOPHAGEAL ECHOCARDIOGRAM;  Surgeon: Gato Valdes MD;  Location: [facility] OR;  Service:      CV CORONARY ANGIOGRAM N/A 4/17/2018    Procedure: Coronary Angiogram;  Surgeon: Osiel Bazzi MD;  Location: [facility];  Service:        CURRENT MEDICATIONS:     Current Outpatient Prescriptions:      acetaminophen (TYLENOL) 325 MG tablet, Take 2 tablets (650 mg total) by mouth every 6 (six) hours as needed for pain or fever., Disp: , Rfl: 0     ascorbic acid, vitamin C, (ASCORBIC ACID WITH JUNIOR HIPS) 500 MG tablet, Take 500 mg by mouth daily., Disp: , Rfl:      aspirin 81 mg chewable tablet, Chew 1 tablet (81 mg total) daily., Disp: , Rfl: 0     atorvastatin (LIPITOR) 20 MG  tablet, Take 1 tablet (20 mg total) by mouth daily., Disp: 30 tablet, Rfl: 3     b complex vitamins tablet, Take 1 tablet by mouth daily., Disp: , Rfl:      CHROMIUM ORAL, Take 500 mcg by mouth daily., Disp: , Rfl:      co-enzyme Q-10 50 mg capsule, Take 100 mg by mouth daily., Disp: , Rfl:      ginkgo biloba 60 mg cap, Take 1 capsule by mouth daily., Disp: , Rfl:      glucosamine HCl 750 mg Tab, Take 1 tablet by mouth daily., Disp: , Rfl:      lecithin 1,200 mg cap, Take 1,200 mg by mouth daily., Disp: , Rfl:      metoprolol tartrate (LOPRESSOR) 25 MG tablet, Take 12.5 mg by mouth 2 (two) times a day. Hold for SBP<95, or AP <60., Disp: , Rfl:      MULTIVIT-MINERALS/FERROUS FUM (MULTI VITAMIN ORAL), Take 1 tablet by mouth daily. Takes antioxidant formula antioxidant multiple vitamin and minerals table orally daily, Disp: , Rfl:      omega-3 fatty acids 500 mg cap, Take 2 capsules by mouth daily., Disp: , Rfl:      polyethylene glycol (MIRALAX) 17 gram packet, Take 1 packet (17 g total) by mouth daily as needed., Disp: , Rfl: 0     vitamin E 1000 UNIT capsule, Take 1,000 Units by mouth daily., Disp: , Rfl:      VITS A,C,E/LUTEIN/MINERALS (VISION FORMULA, WITH LUTEIN, ORAL), Take 1 tablet by mouth daily., Disp: , Rfl:       ALLERGIES:      Allergies   Allergen Reactions     Sulfamethizole Unknown     Doesn't remember reaction. Happened at age 8 years old.        ROS:  Gen: No fevers, weight loss/gain  CV: Denies chest pain, peripheral edema  Pulm: Denies SOB  GI/: Voiding without problems, appetite improving.     LABS:  None    IMAGING:  None    PHYSICAL EXAM:   Vitals:    05/04/18 1245   BP: 110/68   Pulse: 64   Resp: 20     General: Alert and oriented, pleasant, no acute distress.  CV: No peripheral edema.  Pulm: Easy work of breathing on room air.   GI: Soft, non-tender, and non-distended  Incision: Chest and leg incision clean dry and intact without erythema, swelling or drainage  Neuro: CNs grossly  intact.      ASSESSMENT/PLAN:  Chris Redd is a 63 y.o. year old male status post CABG who returns to clinic for postop visit.     1. Surgically doing well. Incisions are healing well with no signs of infection. Sternum is stable.  2. Hemodynamics are stable. No medication changes were needed today.  3. Follow up with your cardiologist as scheduled (below)  4. Start and continue Cardiac Rehab until completed.   5. Continue sternal precautions.  6. May start driving 4 weeks after surgery.  7. No need for further follow-up with CV surgery unless concerns.     Patient has intent to travel to [place] to stay with family in this dayan-op period (2-3 weeks). Would be safe from our perspective for air travel at this point. Letter provided to patient if needed.     Upon return to [place], please see primary care doctor:  Dr. Lynn [phone]     Follow-up with cardiology 6 weeks after surgery:  Dr. Calhoun [phone]     No need for follow-up with CV surgery unless concerns. Please call us at any point with question or issues  [phone]    _______  Lucila Martines PA-C  Cardiothoracic Surgery  [phone]

## 2021-06-18 NOTE — PROGRESS NOTES
Cardiac Rehab  Phase II Assessment    Assessment Date: 5/31/2018    Diagnosis: CABG x5 Date of Onset: 4/23/2018  Procedure:  Date of Onset:  ICD/Pacemaker: No Parameters: N/A  Post-op Complications:None  ECG History:SR EF%:50%  Past Medical History:   Patient Active Problem List   Diagnosis     Chest pain     NSTEMI (non-ST elevated myocardial infarction)     NSVT (nonsustained ventricular tachycardia)     CAD (coronary artery disease), native coronary artery     ARF (acute respiratory failure)     Coagulopathy     Acute blood loss anemia     Hyperglycemia     Acute respiratory failure, unspecified whether with hypoxia or hypercapnia       Physical Assessment  Precautions/ Physical Limitations: Surgical precautions  Oxygen: No  O2 Sats: 99% Lung Sounds: Clear Edema: None  Incisions: healing well  Sleeping Pattern: good   Appetite: good   Nutrition Risk Screen: Completed    Pain  Location:   Characteristics:  Intensity: (0-10 scale) 0  Current Pain Management: Tylenol if needed  Intervention:  Response:     Psychosocial/ Emotional Health  1. In the past 12 months, have you been in a relationship where you have been abused physically, emotionally, sexually or financially? No  notified: NA  2. Who do you turn to for emotional support?: Family and friends  3. Do you have cultural or spiritual needs? No  4. Have there been any major life changes in the past 12 months? Yes . Heart Surgery    Referral Information  Primary Physician: Eliseo Lynn MD  Cardiologist: Ervin Calhoun  Surgeon: Gato Valdes    Home exercise/Equipment: Works out at [facility]    Patient's long-term goal(s): Resume outdoor mountain biking.    1. Living Accommodations: Home Steps: Yes      Support people at home: No   2. Marital Status: single  3. Family is able to assist with cares      [employer]/Community involvement:   4. Recreation/Hobbies: Biking

## 2021-06-18 NOTE — PROGRESS NOTES
ITP ASSESSMENT   Assessment Day: Initial (Pt. had 5 sessions of Cardiac Rehab in [place])  Session  Number 6  Diagnosis: CAB  Risk Stratification: High  Referring Provider: Gato Valdes, *  EXERCISE  Exercise Assessment: Initial     6 Minute Walk Test Deferred because patient started Cardiac Rehab in [place]                            Exercise Plan  Goals Next 30 days  ADL'S: Resume moderate housework sweeping, vacuuming, laundry  Leisure: Resume moderate yard work and gardening: mowing, light raking  Work: Return to work if given M.D. approval      Education Goals: All goals in this section met  Education Goals Met: Patient can state cardiac s/s and appropriate emergency response.;Has system for taking medication.;Medication review.    Exercise Prescription  Exercise Mode: Treadmill;Bike;Nustep  Frequency: 3x week  Duration: 40-50 minutes  No Data Recorded  RPE 11-14  Progression / Met level: 4-6 mets  Resistive Training?: No    Current Exercise (mins/week): 210    Interventions  Home Exercise:  Mode: walking  Frequency: daily  Duration: 30 minutes    Education Material : Educational videos;Provide written material;Individual education and counseling;Offer educational classes    Education Completed  Exercise Education Completed: RPE;Signs and Symptoms;Medication review;Emergency Plan;Home Exercise            Exercise Follow-up/Discharge  Follow up/Discharge: Pt. will establish a home exercise program NUTRITION  Nutrition Assessment: Initial    Nutrition Risk Factors:  Nutrition Risk Factors: Dyslipidemia  Cholesterol: 189  LDL: 141  HDL: 39  Triglycerides: 47    Nutrition Plan  Interventions    Other Nutrition Intervention: Diet Class;Therapist/Pt Discussion;Educational Videos;Provide with Written Material;Referral to DM education      Education Completed  Nutrition Education Completed: Risk factor overview;Weight management    Goals  Nutrition Goals (Next 30 days): Patient will follow a low sodium  "diet;Provide Rate your Plate Survey    Goals Met  Nutrition Goals Met: Patient can identify their risk factors for CAD;Completed Nutritional Risk Screen    Height, Weight, and  BMI  Weight: 214 lb 12.8 oz (97.4 kg)  Height: 6' 4\" (1.93 m)  BMI: 26.16    Nutrition Follow-up         Other Risk Factors  Other Risk Factor Assessment: Initial    HTN Risk Factor: NA    Pre Exercise BP: 120/72  Post Exercise BP: 98/56    Hypertension Plan  Goals      Goals Met    HTN Interventions    HTN Education Completed      Tobacco Risk Factor: Tobacco (smokeless tobacco)        Tobacco Plan  Tobacco Goals  Pt. to remain smoke free.  Risk Factor Follow-up  Will discuss risk factor modification with patient  PSYCHOSOCIAL  Psychosocial Assessment: Initial     [place] COOP Q of L Summary Score: 13    KATHY-D Score: 0    Psychosocial Risk Factor: Stress    Psychosocial Plan  Interventions  If KATHY-D > 15 send letter to MD  Interventions: Offer educational videos and classes;Provide written material;Individual education and counseling    Education Completed  Education Completed: Effects of stress on body    Goals  Goals (Next 30 days): Improvement in [place] COOP score;Oriented to stress management classes    Goals Met  Goals Met: Identified Support system;Identify stressors;Practicing stress management skills    Psychosocial Follow-up             Patient involved in Goal setting?: Yes    Signature: _____________________________________________________________    Date: __________________    Time: __________________  "

## 2021-06-19 NOTE — PROGRESS NOTES
"ITP ASSESSMENT   Assessment Day: 60 Day  Session Number: 21  Precautions: surgical precautions  Diagnosis: CAB  Risk Stratification: High  Referring Provider: Gato Valdes, *  EXERCISE  Exercise Assessment: Reassessment                            Exercise Plan  Goals Next 30 days  ADL'S: Pt will tolerate mowing lawn in one day without breaks.  Leisure: Pt will resume biking without sxs.  Work: Pt. to return to work, end of July.Date to be determined.    Education Goals: All goals in this section met  Education Goals Met: Patient can state cardiac s/s and appropriate emergency response.;Has system for taking medication.;Medication review.                        Goals Met  30 day ADL'S goals met: Pt has met this goal of mowing the lawn with rest breaks within one day. He now plans on completing the mowing without breaks.  30 day Leisure goals met: Pt has not met this goal of  biking due to 3 month surgical precaution date had not arrived yet. \"I plan on biking now that my 3 month date is up.\"  30 day Work goals met: Pt has not returned to work yet. He plans to start 7/30/18.  30 Day Progression: Pt has resumed many activities in the yard and house without sxs.    Initial ADL's goals met: Pt. has resumed sweeping, vacuuming and laundry without sx.  Initial Leisure goals met: Pt. has resumed light raking.  No Data Recorded  Initial Progression: Pt.has resumed many activities. He will gradually start trying more. for example he mowed a few strips of grass at a time, it took him 3-days to compleyte it.  He also has has a few episodes od dizziness which he has been dealing with his primary M.D.     Exercise Prescription  Exercise Mode: Treadmill;Bike;Nustep;Elliptical;Arm Erg.  Frequency: 3x week  Duration: 45-55  No Data Recorded  RPE 11-14  Progression / Met level: 5-7  Resistive Training?: No    Current Exercise (mins/week): 330    Interventions  Home Exercise:  Mode: walking  Frequency: daily  Duration: 30-60 " "min    Education Material : Educational videos;Provide written material;Individual education and counseling;Offer educational classes    Education Completed  Exercise Education Completed: RPE;Signs and Symptoms;Medication review;Emergency Plan;Home Exercise;Warm up/cool down;Benefits of Exercise;End point of exercise;BP/HR Reponse to exercise            Exercise Follow-up/Discharge  Follow up/Discharge: Pt. is tolerating a 4-6 met level, encouraging increased home activity and exercise. NUTRITION  Nutrition Assessment: Reassessment    Nutrition Risk Factors:  Nutrition Risk Factors: Dyslipidemia  Cholesterol: 189  LDL: 141  HDL: 39  Triglycerides: 47    Nutrition Plan  Interventions  Diet Consult: Completed  Other Nutrition Intervention: Diet Class;Provide with Written Material;Educational Videos;Therapist/Pt Discussion  Initial Rate Your Plate Score: 57      Education Completed  Nutrition Education Completed: Low Saturated fat diet;Risk factor overview;Low sodium diet;Weight management;Discussed small frequent meals and nutritional supplements    Goals  Nutrition Goals (Next 30 days): Improve Rate Your Plate Survey Score;Patient demonstrated understanding of cardiac nutrition, no goals identified for the next 30 days    Goals Met  Nutrition Goals Met: Patient can identify their risk factors for CAD;Patient follows a low sodium diet;Completed Nutritional Risk Screen;Provided Rate your Plate Survey;Reviewed Dietitian schedule;Patient states following a low saturated fat diet;Patient knows appropriate portion size    Height, Weight, and  BMI  Weight: 216 lb (98 kg)  Height: 6' 4\" (1.93 m)  BMI: 26.3    Nutrition Follow-up  Follow-up/Discharge: Pt. states he has gone well with eating more fruits and vegetables and low fat milk. He is still working on portion size. will provide support as needed.       Other Risk Factors  Other Risk Factor Assessment: Reassessment    HTN Risk Factor: NA    Pre Exercise BP: 98/64  Post " Exercise BP: 100/60    Hypertension Plan  Goals      Goals Met    HTN Interventions  HTN Education Completed  Tobacco Risk Factor: Tobacco (smokeless)    Initial Use:: daily  Current Use:: 0    Tobacco Plan  Tobacco Goals  Tobacco Goals: Patient remain tobacco free    Goals Met  Tobacco Goals Met: Patient remain tobacco free    Tobacco Interventions  Tobacco Interventions: Therapist/patient discussion;Provide written material;Offer educational videos;Offer class on risk factor modification    Tobacco Education Completed  Tobacco Education Completed: Identifies triggers;Health benefits of tobacco cessation;Risk factor overview    Risk Factor Follow-up   Follow-up/Discharge: pt. is doing well with risk factor modification   PSYCHOSOCIAL  Psychosocial Assessment: Reassessment       Psychosocial Risk Factor: Stress    Psychosocial Plan  Interventions  Interventions: Offer educational videos and classes;Provide written material;Individual education and counseling    Education Completed  Education Completed: Relaxation/Coping Techniques;S/S of depression;Effects of stress on body    Goals  Goals (Next 30 days): Improvement in [place] CO[place] score    Goals Met  Goals Met: Identified Support system;Oriented to stress management classes;Identify stressors;Practicing stress management skills    Psychosocial Follow-up  Follow-up/Discharge: Pt. states he has appopriate support and coping skills.           Patient involved in Goal setting?: Yes    Signature: _____________________________________________________________    Date: __________________    Time: __________________

## 2021-06-19 NOTE — PROGRESS NOTES
"ITP ASSESSMENT   Assessment Day: 90 Day  Session Number: 24 Last Day  Precautions: Standard Cardiac/ post surgery  Diagnosis: CAB  Risk Stratification: High  Referring Provider: Gato Valdes, *  EXERCISE  Exercise Assessment: Discharge     6 Minute Walk Test deferred because Pt. started Phase 2 in [place], using a different assessment.                           Exercise Plan  Goals Next 30 days  ADL'S: Pt will tolerate mowing lawn in one day without breaks.  Leisure: Pt will resume biking without sxs.      Education Goals: All goals in this section met  Education Goals Met: Patient can state cardiac s/s and appropriate emergency response.;Has system for taking medication.;Medication review.   Pt. plans to work out at the [facility] 5-7 days per week.                        Goals Met  60 day ADL'S goals met: Pt. is able to tolerate mowing the lawn in one day.  60 day Leisure goals met: Pt. has resumed outdoor biking  60 day Work goals met: Pt. will resume full time work next week  60 Day Progression: Pt. has met all his stated goals    30 day ADL'S goals met: Pt has met this goal of mowing the lawn with rest breaks within one day. He now plans on completing the mowing without breaks.  30 day Leisure goals met: Pt has not met this goal of  biking due to 3 month surgical precaution date had not arrived yet. \"I plan on biking now that my 3 month date is up.\"  30 day Work goals met: Pt has not returned to work yet. He plans to start 7/30/18.  30 Day Progression: Pt has resumed many activities in the yard and house without sxs.    Initial ADL's goals met: Pt. has resumed sweeping, vacuuming and laundry without sx.  Initial Leisure goals met: Pt. has resumed light raking.    Initial Progression: Pt.has resumed many activities. He will gradually start trying more. for example he mowed a few strips of grass at a time, it took him 3-days to complete it.  He also has has a few episodes od " dizziness which he has been dealing with his primary M.D.     Exercise Prescription  Exercise Mode: Treadmill;Bike;Nustep;Elliptical;Arm Erg.  Frequency: 3x week  Duration: 45-55    RPE 11-14  Progression / Met level: 5-7  Resistive Training?: No    Current Exercise (mins/week): 330    Interventions  Home Exercise:  Mode: walking,weight training  Frequency: daily  Duration: 30-60 minutes    Education Material : Educational videos;Provide written material;Individual education and counseling;Offer educational classes    Education Completed  Exercise Education Completed: RPE;Signs and Symptoms;Medication review;Emergency Plan;Home Exercise;Warm up/cool down;Benefits of Exercise;End point of exercise;BP/HR Reponse to exercise;Cardiac Anatomy;FITT Principles;Stretching;Strength training            Exercise Follow-up/Discharge  Follow up/Discharge: Pt. continues to tolerate working out a 6 met level  NUTRITION  Nutrition Assessment: Discharge    Nutrition Risk Factors:  Nutrition Risk Factors: Dyslipidemia  Cholesterol: 189  LDL: 141  HDL: 39  Triglycerides: 47    Nutrition Plan  Interventions  Diet Consult: Completed  Other Nutrition Intervention: Provide with Written Material;Educational Videos;Therapist/Pt Discussion;Diet Class  Initial Rate Your Plate Score: 57      Education Completed  Nutrition Education Completed: Low Saturated fat diet;Risk factor overview;Low sodium diet;Weight management;Discussed small frequent meals and nutritional supplements    Goals  Nutrition Goals (Next 30 days): Patient demonstrated understanding of cardiac nutrition, no goals identified for the next 30 days    Goals Met  Nutrition Goals Met: Patient can identify their risk factors for CAD;Patient follows a low sodium diet;Completed Nutritional Risk Screen;Provided Rate your Plate Survey;Reviewed Dietitian schedule;Patient states following a low saturated fat diet;Patient knows appropriate portion size;Rate Your Plate Survey Score  "Improved    Height, Weight, and  BMI  Weight: 218 lb (98.9 kg)  Height: 6' 4\" (1.93 m)  BMI: 26.55    Nutrition Follow-up  Follow-up/Discharge: Pt. states he has gone well with eating more fruits and vegetables and low fat milk. He is still working on portion size. will provide support as needed.       Other Risk Factors  Other Risk Factor Assessment: Discharge    HTN Risk Factor: NA    Pre Exercise BP: 108/75  Post Exercise BP: 112/66    Hypertension Plan  Goals      Goals Met      HTN Interventions      HTN Education Completed      Tobacco Risk Factor: Tobacco (smokeless)    Initial Use:: daily  Current Use:: 0    Tobacco Plan  Tobacco Goals  Tobacco Goals: Patient remain tobacco free    Goals Met  Tobacco Goals Met: Patient remain tobacco free    Tobacco Interventions  Tobacco Interventions: Therapist/patient discussion;Provide written material;Offer educational videos;Offer class on risk factor modification    Tobacco Education Completed  Tobacco Education Completed: Identifies triggers;Health benefits of tobacco cessation;Risk factor overview    Risk Factor Follow-up   Follow-up/Discharge: pt. is doing well with risk factor modification   PSYCHOSOCIAL  Psychosocial Assessment: Discharge     [place] COOP Q of L Summary Score: 9    KATHY-D Score: 0    Psychosocial Risk Factor: Stress    Psychosocial Plan  Interventions  Interventions: Offer educational videos and classes;Provide written material;Individual education and counseling    Education Completed  Education Completed: Relaxation/Coping Techniques;S/S of depression;Effects of stress on body    Goals  Goals (Next 30 days): Patient demonstrates understanding of stress, no goals identified for the next 30 days    Goals Met  Goals Met: Improvement in [place] COOP score;Identified Support system;Oriented to stress management classes;Identify stressors;Practicing stress management skills    Psychosocial Follow-up  Follow-up/Discharge: Pt. states he has " appopriate support and coping skills.           Patient involved in Goal setting?: Yes    Signature: _____________________________________________________________    Date: __________________    Time: _________________

## 2021-06-26 NOTE — PROGRESS NOTES
Progress Notes by Dennis Calhoun MD at 12/4/2018  8:30 AM     Author: Dennis Calhoun MD Service: -- Author Type: Physician    Filed: 12/4/2018  8:37 AM Encounter Date: 12/4/2018 Status: Signed    : Dennis Calhoun MD (Physician)           Click to link to [facility] HEART CARE NOTE    Thank you, Dr. Eliseo Lynn MD, for asking the [facility] Heart Care team to see Mr. Chris Redd to evaluate Follow-up.      Assessment/Recommendations   Assessment:    1. Coronary artery disease s/p CABG x5 - stable.  2. NSVT in the setting of NSTEMI - no symptomatic recurrence  3. Hypertension - stable    Plan:  1. Continue all medications as prescribed.   2. Continue regular exercise  3. Follow up in 1 year or sooner if needed.            History of Present Illness   Mr. Chris Redd is a 63 y.o. male with a significant past history of an NSTEMI s/p CABGx5 with Dr. Valdes on 4/23/2018 who presents for follow-up.     I last saw Chris about 6 months ago at which time he was doing well from a cardiac standpoint and recovering from surgery. He is exercising regularly on a treadmill or bicycle 30 minutes 3 days per week and some weights on most of the off days. He is tolerating exercise well without complaint.    Other than noted above, Mr. Redd denies any chest pain/pressure/tightness, shortness of breath at rest or with exertion, light headedness/dizziness, pre-syncope, syncope, lower extremity swelling, palpitations, paroxysmal nocturnal dyspnea (PND), or orthopnea.     Cardiac Problems and Cardiac Diagnostics   Cardiac Problem List:  1. Coronary artery disease with NSTEMI 4/2018 treated with CABG x5    Most Recent Cardiac testing:    ECHO (report reviewed):   Echo results:   Results for orders placed during the hospital encounter of 04/16/18   Echo Monitor MARYBEL [ECH25] 04/23/2018    Narrative   Normal left ventricular size.    Left ventricle ejection fraction is  normal.    Normal right ventricular size and systolic function.          Cardiac cath: 4/17/18    Estimated blood loss was <20 ml.    Mid RCA lesion 100% stenosed.    Prox RCA to Mid RCA lesion 40% stenosed.    Prox LAD lesion 50% stenosed.    Dist Cx lesion 90% stenosed.    Prox Cx lesion 40% stenosed.    Mid Cx lesion 30% stenosed.    Ost LAD to Prox LAD lesion 35% stenosed.    Ost 2nd Mrg-1 lesion 60% stenosed.    Ost 2nd Mrg-2 lesion 99% stenosed.    Ost 1st Mrg lesion 40% stenosed.         Medications  Allergies   Current Outpatient Medications   Medication Sig Dispense Refill   ? acetaminophen (TYLENOL) 325 MG tablet Take 2 tablets (650 mg total) by mouth every 6 (six) hours as needed for pain or fever.  0   ? amLODIPine (NORVASC) 2.5 MG tablet Take 2.5 mg by mouth daily.     ? ascorbic acid, vitamin C, (ASCORBIC ACID WITH JUNIOR HIPS) 500 MG tablet Take 500 mg by mouth daily.     ? aspirin 81 mg chewable tablet Chew 1 tablet (81 mg total) daily.  0   ? atorvastatin (LIPITOR) 20 MG tablet Take 1 tablet (20 mg total) by mouth daily. 30 tablet 3   ? b complex vitamins tablet Take 1 tablet by mouth daily.     ? CHROMIUM ORAL Take 500 mcg by mouth daily.     ? co-enzyme Q-10 50 mg capsule Take 100 mg by mouth daily.     ? glucosamine HCl 750 mg Tab Take 1 tablet by mouth daily.     ? lecithin 1,200 mg cap Take 1,200 mg by mouth daily.     ? metoprolol tartrate (LOPRESSOR) 25 MG tablet Take 12.5 mg by mouth 2 (two) times a day. Hold for SBP<95, or AP <60.     ? MULTIVIT-MINERALS/FERROUS FUM (MULTI VITAMIN ORAL) Take 1 tablet by mouth daily. Takes antioxidant formula antioxidant multiple vitamin and minerals table orally daily     ? omega-3 fatty acids 500 mg cap Take 2 capsules by mouth daily.     ? polyethylene glycol (MIRALAX) 17 gram packet Take 1 packet (17 g total) by mouth daily as needed.  0   ? vitamin E 1000 UNIT capsule Take 1,000 Units by mouth daily.     ? VITS A,C,E/LUTEIN/MINERALS (VISION FORMULA, WITH  LUTEIN, ORAL) Take 1 tablet by mouth daily.     ? ginkgo biloba 60 mg cap Take 1 capsule by mouth daily.       No current facility-administered medications for this visit.       Allergies   Allergen Reactions   ? Sulfamethizole Unknown     Doesn't remember reaction. Happened at age 8 years old.         Physical Examination Review of Systems   Vitals:    12/04/18 0814   BP: 118/72   Pulse: (!) 56   Resp: 16     Body mass index is 27.39 kg/m .  Wt Readings from Last 3 Encounters:   12/04/18 (!) 225 lb (102.1 kg)   07/27/18 216 lb (98 kg)   07/25/18 218 lb (98.9 kg)       General Appearance:   no distress, normal body habitus   ENT/Mouth: membranes moist, no apparent gingival bleeding.      EYES:  no scleral icterus, normal conjunctivae   Neck: Supple.   Respiratory:   lungs are clear to auscultation   Cardiovascular:   Regular rhythm, normal rate. Normal first and second heart sounds with no murmurs, rubs, or gallops; the carotid, right radial and posterior tibial pulses are intact. Left radial pulse is absent (used for bypass surgery) Jugular venous pressure normal, no edema bilaterally    Abdomen/GI:  Soft, non-tender   Extremities: no cyanosis or clubbing   Skin: no xanthelasma, warm.    Heme/lymph/ Immunology No apparent bleeding noted.   Neurologic: Alert and oriented. normal gait, no tremors     Psychiatric: Pleasant, calm, appropriate affect.    A complete 10 system review of systems was performed and is negative except as mentioned in the HPI or below:  General: WNL  Eyes: WNL  Ears/Nose/Throat: WNL  Lungs: WNL  Heart: WNL  Stomach: WNL  Bladder: WNL  Muscle/Joints: WNL  Skin: WNL  Nervous System: WNL  Mental Health: WNL     Blood: WNL       Past History   Past Medical History:   Past Medical History:   Diagnosis Date   ? Family history of myocardial infarction     pt adopted; unknown       Past Surgical History:   Past Surgical History:   Procedure Laterality Date   ? CORONARY ARTERY BYPASS GRAFT N/A  4/23/2018    Procedure: CORONARY ARTERY BYPASS X5, LEFT LEG ENDOSCOPIC VEIN HARVEST, LEFT INTERNAL MAMMARY ARTERY, ANESTHESIA TRANSESOPHAGEAL ECHOCARDIOGRAM;  Surgeon: Gato Valdes MD;  Location: [facility] OR;  Service:    ? CV CORONARY ANGIOGRAM N/A 4/17/2018    Procedure: Coronary Angiogram;  Surgeon: Osiel Bazzi MD;  Location: [facility] Cath Lab;  Service:        Family History:   Pt is adopted. Family history is unknown.    Social History:   Social History     Socioeconomic History   ? Marital status: Single     Spouse name: Not on file   ? Number of children: Not on file   ? Years of education: Not on file   ? Highest education level: Not on file   Social Needs   ? Financial resource strain: Not on file   ? Food insecurity - worry: Not on file   ? Food insecurity - inability: Not on file   ? Transportation needs - medical: Not on file   ? Transportation needs - non-medical: Not on file   Occupational History   ? Not on file   Tobacco Use   ? Smoking status: Former Smoker   ? Smokeless tobacco: Former User   ? Tobacco comment: states goes through a tin in one month   Substance and Sexual Activity   ? Alcohol use: No   ? Drug use: No   ? Sexual activity: Not on file   Other Topics Concern   ? Not on file   Social History Narrative   ? Not on file              Lab Results    Chemistry/lipid CBC Cardiac Enzymes/BNP/TSH/INR   Lab Results   Component Value Date    CHOL 189 04/17/2018    HDL 39 (L) 04/17/2018    LDLCALC 141 (H) 04/17/2018    TRIG 47 04/17/2018    CREATININE 0.82 05/04/2018    BUN 16 05/04/2018    K 4.4 05/04/2018     05/04/2018     05/04/2018    CO2 26 05/04/2018    Lab Results   Component Value Date    WBC 8.2 04/26/2018    HGB 11.4 (L) 05/04/2018    HCT 28.1 (L) 04/26/2018    MCV 89 04/26/2018     04/27/2018    Lab Results   Component Value Date    TROPONINI 17.08 (HH) 04/17/2018    INR 1.23 (H) 04/24/2018          Dennis Calhoun MD  Non-invasive  Cardiology  [facility] hospital

## 2021-06-26 NOTE — PROGRESS NOTES
Progress Notes by Dennis Calhoun MD at 6/11/2018  2:10 PM     Author: Dennis Calhoun MD Service: -- Author Type: Physician    Filed: 6/11/2018  5:02 PM Encounter Date: 6/11/2018 Status: Signed    : Dennis Calhoun MD (Physician)           Click to link to [facility] Heart [facility] HEART CARE NOTE    Thank you, Dr. Eliseo Lynn MD, for asking the [facility] Heart Care team to see Mr. Chris Redd to evaluate Follow-up.      Assessment/Recommendations   Assessment:    1. Coronary artery disease s/p CABG x5  2. NSVT in the setting of NSTEMI - no symptomatic recurrence    Plan:  1. Continue all medications as prescribed.  2. Continue cardiac rehab  3. Will fill out short term disability paperwork to allow pt to complete cardiac rehab  Before returning to work.           History of Present Illness   Mr. Chris Redd is a 63 y.o. male with a significant past history of an NSTEMI s/p CABGx5 with Dr. Valdes on 4/23/2018 who presents for follow-up.     MR. Redd is recovering well from surgery. No recurrent anginal pain. No sternal pain. Tolerating rehab. Trying not to overdo it because he feels so well.    He is asking about return to work.     Other than noted above, Mr. Redd denies any chest pain/pressure/tightness, shortness of breath at rest or with exertion, light headedness/dizziness, pre-syncope, syncope, lower extremity swelling, palpitations, paroxysmal nocturnal dyspnea (PND), or orthopnea.     Cardiac Problems and Cardiac Diagnostics   Cardiac Problem List:  1. Coronary artery disease     Most Recent Cardiac testing:    ECHO (report reviewed):   Echo results:   Results for orders placed during the hospital encounter of 04/16/18   Echo Monitor MARYBEL [ECH25] 04/23/2018    Narrative   Normal left ventricular size.    Left ventricle ejection fraction is normal.    Normal right ventricular size and systolic function.          Cardiac cath: 4/17/18    Estimated  blood loss was <20 ml.    Mid RCA lesion 100% stenosed.    Prox RCA to Mid RCA lesion 40% stenosed.    Prox LAD lesion 50% stenosed.    Dist Cx lesion 90% stenosed.    Prox Cx lesion 40% stenosed.    Mid Cx lesion 30% stenosed.    Ost LAD to Prox LAD lesion 35% stenosed.    Ost 2nd Mrg-1 lesion 60% stenosed.    Ost 2nd Mrg-2 lesion 99% stenosed.    Ost 1st Mrg lesion 40% stenosed.         Medications  Allergies   Current Outpatient Prescriptions   Medication Sig Dispense Refill   ? acetaminophen (TYLENOL) 325 MG tablet Take 2 tablets (650 mg total) by mouth every 6 (six) hours as needed for pain or fever.  0   ? amLODIPine (NORVASC) 2.5 MG tablet Take 2.5 mg by mouth daily.     ? ascorbic acid, vitamin C, (ASCORBIC ACID WITH JUNIOR HIPS) 500 MG tablet Take 500 mg by mouth daily.     ? aspirin 81 mg chewable tablet Chew 1 tablet (81 mg total) daily.  0   ? atorvastatin (LIPITOR) 20 MG tablet Take 1 tablet (20 mg total) by mouth daily. 30 tablet 3   ? b complex vitamins tablet Take 1 tablet by mouth daily.     ? CHROMIUM ORAL Take 500 mcg by mouth daily.     ? co-enzyme Q-10 50 mg capsule Take 100 mg by mouth daily.     ? ginkgo biloba 60 mg cap Take 1 capsule by mouth daily.     ? glucosamine HCl 750 mg Tab Take 1 tablet by mouth daily.     ? lecithin 1,200 mg cap Take 1,200 mg by mouth daily.     ? metoprolol tartrate (LOPRESSOR) 25 MG tablet Take 12.5 mg by mouth 2 (two) times a day. Hold for SBP<95, or AP <60.     ? MULTIVIT-MINERALS/FERROUS FUM (MULTI VITAMIN ORAL) Take 1 tablet by mouth daily. Takes antioxidant formula antioxidant multiple vitamin and minerals table orally daily     ? omega-3 fatty acids 500 mg cap Take 2 capsules by mouth daily.     ? polyethylene glycol (MIRALAX) 17 gram packet Take 1 packet (17 g total) by mouth daily as needed.  0   ? vitamin E 1000 UNIT capsule Take 1,000 Units by mouth daily.     ? VITS A,C,E/LUTEIN/MINERALS (VISION FORMULA, WITH LUTEIN, ORAL) Take 1 tablet by mouth daily.        No current facility-administered medications for this visit.       Allergies   Allergen Reactions   ? Sulfamethizole Unknown     Doesn't remember reaction. Happened at age 8 years old.         Physical Examination Review of Systems   Vitals:    06/11/18 1353   BP: 98/60   Pulse: 68   Resp: 18     Body mass index is 26.29 kg/(m^2).  Wt Readings from Last 3 Encounters:   06/11/18 216 lb (98 kg)   06/11/18 216 lb (98 kg)   06/08/18 215 lb (97.5 kg)       General Appearance:   no distress, normal body habitus   ENT/Mouth: membranes moist, no apparent gingival bleeding.      EYES:  no scleral icterus, normal conjunctivae   Neck: no carotid bruits. No anterior cervical lymphadenopaty   Respiratory:   lungs are clear to auscultation, no rales or wheezing, stable and well-healing sternal scar, equal chest wall expansion    Cardiovascular:   Regular rhythm, normal rate. Normal first and second heart sounds with no murmurs, rubs, or gallops; the carotid, radial and posterior tibial pulses are intact, Jugular venous pressure normal, no edema bilaterally    Abdomen/GI:  no organomegaly, masses, bruits, or tenderness; bowel sounds are present   Extremities: no cyanosis or clubbing   Skin: no xanthelasma, warm.    Heme/lymph/ Immunology No apparent bleeding noted.   Neurologic: Alert and oriented. normal gait, no tremors     Psychiatric: Pleasant, calm, appropriate affect.    A complete 10 system review of systems was performed and is negative except as mentioned in the HPI or below:  General: Weight Loss  Eyes: WNL  Ears/Nose/Throat: WNL  Lungs: WNL  Heart: WNL  Stomach: WNL  Bladder: WNL  Muscle/Joints: WNL  Skin: WNL  Nervous System: WNL  Mental Health: WNL     Blood: WNL       Past History   Past Medical History:   Past Medical History:   Diagnosis Date   ? Family history of myocardial infarction     pt adopted; unknown       Past Surgical History:   Past Surgical History:   Procedure Laterality Date   ? CORONARY ARTERY  BYPASS GRAFT N/A 4/23/2018    Procedure: CORONARY ARTERY BYPASS X5, LEFT LEG ENDOSCOPIC VEIN HARVEST, LEFT INTERNAL MAMMARY ARTERY, ANESTHESIA TRANSESOPHAGEAL ECHOCARDIOGRAM;  Surgeon: Gato Valdes MD;  Location: [facility] OR;  Service:    ? CV CORONARY ANGIOGRAM N/A 4/17/2018    Procedure: Coronary Angiogram;  Surgeon: Osiel Bazzi MD;  Location: [facility] Cath Lab;  Service:        Family History:   Pt is adopted. Family history is unknown.    Social History:   Social History     Social History   ? Marital status: Single     Spouse name: N/A   ? Number of children: N/A   ? Years of education: N/A     Occupational History   ? Not on file.     Social History Main Topics   ? Smoking status: Never Smoker   ? Smokeless tobacco: Former User      Comment: states goes through a tin in one month   ? Alcohol use No   ? Drug use: No   ? Sexual activity: Not on file     Other Topics Concern   ? Not on file     Social History Narrative              Lab Results    Chemistry/lipid CBC Cardiac Enzymes/BNP/TSH/INR   Lab Results   Component Value Date    CHOL 189 04/17/2018    HDL 39 (L) 04/17/2018    LDLCALC 141 (H) 04/17/2018    TRIG 47 04/17/2018    CREATININE 0.82 05/04/2018    BUN 16 05/04/2018    K 4.4 05/04/2018     05/04/2018     05/04/2018    CO2 26 05/04/2018    Lab Results   Component Value Date    WBC 8.2 04/26/2018    HGB 11.4 (L) 05/04/2018    HCT 28.1 (L) 04/26/2018    MCV 89 04/26/2018     04/27/2018    Lab Results   Component Value Date    TROPONINI 17.08 (HH) 04/17/2018    INR 1.23 (H) 04/24/2018          Dennis Calhoun MD  Non-invasive Cardiology  [facility]

## 2021-06-27 ENCOUNTER — HEALTH MAINTENANCE LETTER (OUTPATIENT)
Age: 66
End: 2021-06-27

## 2021-06-28 NOTE — PROGRESS NOTES
"Progress Notes by Dennis Calhoun MD at 3/20/2020  8:50 AM     Author: Dennis Calhoun MD Service: -- Author Type: Physician    Filed: 3/20/2020  9:07 AM Encounter Date: 3/20/2020 Status: Signed    : Dennis Calhoun MD (Physician)           The patient has been notified of following:     \"This telephone visit will be conducted via a call between you and your physician/provider. We have found that certain health care needs can be provided without the need for a physical exam.  This service lets us provide the care you need with a phone conversation.  If a prescription is necessary we can send it directly to your pharmacy.  If lab work is needed we can place an order for that and you can then stop by our lab to have the test done at a later time. If during the course of the call the physician/provider feels a telephone visit is not appropriate, you will not be charged for this service.\" Verbal consent has been obtained for this service by care team member:         HEART CARE PHONE ENCOUNTER        The patient has chosen to have the visit conducted as a telephone visit, to reduce risk of exposure given the current status of Coronavirus in our community. This telephone visit is being conducted via a call between the patient and physician/provider. Health care needs are being provided without a physical exam.     Assessment/Recommendations   Assessment:    1. Coronary artery disease with prior 5v CABG  2. NSVT in the setting of NSTEMI  3. hypertension    Plan:  1. Stop amlodipine  2. Exercise regularly  3. Check BP a few times per week and pt to call if BP > 130/90  4. Follow up in 1 year or sooner if needed.    I have reviewed the note as documented.  This accurately captures the substance of my conversation with the patient.    Total time of call between patient and provider was 14 minutes   Start Time: 0848   Stop Time: 0902       History of Present Illness/Subjective    Chris Redd is a 64 " y.o. male who is being evaluated via a billable telephone visit.      Reese has a history of coronary artery disease with prior 5 vessel bypass surgery.  He also had nonsustained ventricular tachycardia in the setting of his presenting NSTEMI prior to his surgery.  Since I last saw him in December 2018, Reese reports feeling generally well.  He is exercising sporadically without exertional symptoms or limitations.  His blood pressures been well controlled.  His blood pressure this morning is 116/62 mmHg with a pulse rate of 56 bpm.  He has no specific cardiac concerns today.    I have reviewed and updated the patient's Past Medical History, Social History, Family History and Medication List.     Physical Examination not perform given phone encounter Review of Systems                                                Medical History  Surgical History Family History Social History   Past Medical History:   Diagnosis Date   ? Family history of myocardial infarction     pt adopted; unknown    Past Surgical History:   Procedure Laterality Date   ? CORONARY ARTERY BYPASS GRAFT N/A 4/23/2018    Procedure: CORONARY ARTERY BYPASS X5, LEFT LEG ENDOSCOPIC VEIN HARVEST, LEFT INTERNAL MAMMARY ARTERY, ANESTHESIA TRANSESOPHAGEAL ECHOCARDIOGRAM;  Surgeon: Gato Valdes MD;  Location: [facility] Main OR;  Service:    ? CV CORONARY ANGIOGRAM N/A 4/17/2018    Procedure: Coronary Angiogram;  Surgeon: Osiel Bazzi MD;  Location: [facility] Cath Lab;  Service:     No family history on file. Social History     Socioeconomic History   ? Marital status: Single     Spouse name: Not on file   ? Number of children: Not on file   ? Years of education: Not on file   ? Highest education level: Not on file   Occupational History   ? Not on file   Social Needs   ? Financial resource strain: Not on file   ? Food insecurity     Worry: Not on file     Inability: Not on file   ? Transportation needs     Medical: Not on file     Non-medical:  Not on file   Tobacco Use   ? Smoking status: Former Smoker   ? Smokeless tobacco: Former User   ? Tobacco comment: states goes through a tin in one month   Substance and Sexual Activity   ? Alcohol use: No   ? Drug use: No   ? Sexual activity: Not on file   Lifestyle   ? Physical activity     Days per week: Not on file     Minutes per session: Not on file   ? Stress: Not on file   Relationships   ? Social connections     Talks on phone: Not on file     Gets together: Not on file     Attends Scientologist service: Not on file     Active member of club or organization: Not on file     Attends meetings of clubs or organizations: Not on file     Relationship status: Not on file   ? Intimate partner violence     Fear of current or ex partner: Not on file     Emotionally abused: Not on file     Physically abused: Not on file     Forced sexual activity: Not on file   Other Topics Concern   ? Not on file   Social History Narrative   ? Not on file          Medications  Allergies   Current Outpatient Medications   Medication Sig Dispense Refill   ? acetaminophen (TYLENOL) 325 MG tablet Take 2 tablets (650 mg total) by mouth every 6 (six) hours as needed for pain or fever.  0   ? amLODIPine (NORVASC) 2.5 MG tablet Take 2.5 mg by mouth daily.     ? ascorbic acid, vitamin C, (ASCORBIC ACID WITH JUNIOR HIPS) 500 MG tablet Take 500 mg by mouth daily.     ? aspirin 81 mg chewable tablet Chew 1 tablet (81 mg total) daily.  0   ? atorvastatin (LIPITOR) 20 MG tablet Take 1 tablet (20 mg total) by mouth daily. 30 tablet 3   ? b complex vitamins tablet Take 1 tablet by mouth daily.     ? CHROMIUM ORAL Take 500 mcg by mouth daily.     ? co-enzyme Q-10 50 mg capsule Take 100 mg by mouth daily.     ? ginkgo biloba 60 mg cap Take 1 capsule by mouth daily.     ? glucosamine HCl 750 mg Tab Take 1 tablet by mouth daily.     ? lecithin 1,200 mg cap Take 1,200 mg by mouth daily.     ? metoprolol tartrate (LOPRESSOR) 25 MG tablet Take 12.5 mg by mouth  2 (two) times a day. Hold for SBP<95, or AP <60.     ? MULTIVIT-MINERALS/FERROUS FUM (MULTI VITAMIN ORAL) Take 1 tablet by mouth daily. Takes antioxidant formula antioxidant multiple vitamin and minerals table orally daily     ? omega-3 fatty acids 500 mg cap Take 2 capsules by mouth daily.     ? polyethylene glycol (MIRALAX) 17 gram packet Take 1 packet (17 g total) by mouth daily as needed.  0   ? vitamin E 1000 UNIT capsule Take 1,000 Units by mouth daily.     ? VITS A,C,E/LUTEIN/MINERALS (VISION FORMULA, WITH LUTEIN, ORAL) Take 1 tablet by mouth daily.       No current facility-administered medications for this visit.     Allergies   Allergen Reactions   ? Sulfamethizole Unknown     Doesn't remember reaction. Happened at age 8 years old.          Lab Results    Chemistry/lipid CBC Cardiac Enzymes/BNP/TSH/INR   Lab Results   Component Value Date    CHOL 133 06/13/2019    HDL 40 06/13/2019    LDLCALC 83 06/13/2019    TRIG 52 06/13/2019    CREATININE 0.76 06/13/2019    BUN 14 06/13/2019    K 4.6 06/13/2019     06/13/2019     06/13/2019    CO2 27 06/13/2019    Lab Results   Component Value Date    WBC 8.2 04/26/2018    HGB 11.4 (L) 05/04/2018    HCT 28.1 (L) 04/26/2018    MCV 89 04/26/2018     04/27/2018    Lab Results   Component Value Date    TROPONINI 17.08 (HH) 04/17/2018    INR 1.23 (H) 04/24/2018        Dennis Calhoun

## 2021-06-30 NOTE — PROGRESS NOTES
"Progress Notes by Dennis Calhoun MD at 1/20/2021 11:30 AM     Author: Dennis Calhoun MD Service: -- Author Type: Physician    Filed: 1/20/2021 11:58 AM Encounter Date: 1/20/2021 Status: Signed    : Dennis Calhoun MD (Physician)           The patient has been notified of following:     \"This video visit will be conducted via a call between you and your physician/provider. We have found that certain health care needs can be provided without the need for an in-person physical exam.  This service lets us provide the care you need with a video conversation.  If a prescription is necessary we can send it directly to your pharmacy.  If lab work is needed we can place an order for that and you can then stop by our lab to have the test done at a later time.      Patient has given verbal consent to a Video visit? Yes    HEART CARE VIDEO ENCOUNTER        The patient has chosen to have the visit conducted as a video visit, to reduce risk of exposure given the current status of Coronavirus in our community. This video visit is being conducted via a call between the patient and physician/provider. Health care needs are being provided without a physical exam.     Assessment/Recommendations   Assessment  1. Coronary artery disease with prior 5v CABG - stable without angina.  2. NSVT in the setting of NSTEMI - no known recurrence.  3. Hypertension - well-controlled.  4. Hyperlipidemia - on atorvastatin 20 mg daily.stable.    Plan:  1. Discontinue metoprolol  2. Continue aspirin and atorvastatin indefinitely  3. Encouraged daily exercise  4. Follow up in 1 year or sooner if needed.    I have reviewed the note as documented.  This accurately captures the substance of my conversation with the patient.    Total time of video between patient and provider was 16 minutes   Start Time: 1138   Stop Time: 1154    Originating Location (pt. Location): Home    Distant Location (provider location):  [facility]     Mode of Communication:  Video Conference via [email or web address]       History of Present Illness/Subjective    Chris Redd is a 65 y.o. male who is being evaluated via a billable video visit and has consented to a video visit.     Reese has a history of coronary artery disease with prior 5 vessel bypass surgery on 4/23/2018.  He also had nonsustained ventricular tachycardia in the setting of his presenting NSTEMI prior to his surgery. Today, he reports feeling generally well. He reports getting exercise on stationary bicycle 2-3 days per week for about a half hour and feels well without cardiac symptoms or limitations. Home blood pressure today was 117/68, which is typical for him. He denies any cardiorespiratory symptoms at this time.    I have reviewed and updated the patient's Past Medical History, Social History, Family History and Medication List.     Physical Examination performed via live video encounter Review of Systems   General Appearance:   no distress, normal body habitus, upright.   ENT/Mouth: membranes moist, no nasal discharge or bleeding gums.  Normal head shape, no evidence of injury or laceration.     EYES:  no scleral icterus, normal conjunctivae   Neck:  Supple   Chest/Lungs:   No audible wheezing equal chest wall expansion. Non labored breathing.  No cough.   Cardiovascular:   No evidence of elevated jugular venous pressure.  No evidence of pitting edema bilaterally    Skin: no xanthelasma, normal skin collar. No evidence of facial lacerations.      Neurologic: Normal arm motion bilateral, no tremors.  No evidence of focal defect.       Psychiatric: alert and oriented x3, calm                                               Medical History  Surgical History Family History Social History   Past Medical History:   Diagnosis Date   ? Family history of myocardial infarction     pt adopted; unknown    Past Surgical History:   Procedure Laterality Date   ? CORONARY ARTERY BYPASS GRAFT  N/A 4/23/2018    Procedure: CORONARY ARTERY BYPASS X5, LEFT LEG ENDOSCOPIC VEIN HARVEST, LEFT INTERNAL MAMMARY ARTERY, ANESTHESIA TRANSESOPHAGEAL ECHOCARDIOGRAM;  Surgeon: Gato Valdes MD;  Location: [facility] Main OR;  Service:    ? CV CORONARY ANGIOGRAM N/A 4/17/2018    Procedure: Coronary Angiogram;  Surgeon: Osiel Bazzi MD;  Location: [facility] Cath Lab;  Service:     No family history on file.   Social History     Socioeconomic History   ? Marital status: Single     Spouse name: Not on file   ? Number of children: Not on file   ? Years of education: Not on file   ? Highest education level: Not on file   Occupational History   ? Not on file   Social Needs   ? Financial resource strain: Not on file   ? Food insecurity     Worry: Not on file     Inability: Not on file   ? Transportation needs     Medical: Not on file     Non-medical: Not on file   Tobacco Use   ? Smoking status: Former Smoker   ? Smokeless tobacco: Former User   ? Tobacco comment: states goes through a tin in one month   Substance and Sexual Activity   ? Alcohol use: No   ? Drug use: No   ? Sexual activity: Not on file   Lifestyle   ? Physical activity     Days per week: Not on file     Minutes per session: Not on file   ? Stress: Not on file   Relationships   ? Social connections     Talks on phone: Not on file     Gets together: Not on file     Attends Confucianism service: Not on file     Active member of club or organization: Not on file     Attends meetings of clubs or organizations: Not on file     Relationship status: Not on file   ? Intimate partner violence     Fear of current or ex partner: Not on file     Emotionally abused: Not on file     Physically abused: Not on file     Forced sexual activity: Not on file   Other Topics Concern   ? Not on file   Social History Narrative   ? Not on file          Medications  Allergies   Current Outpatient Medications   Medication Sig Dispense Refill   ? ascorbic acid, vitamin C,  (ASCORBIC ACID WITH JUNIOR HIPS) 500 MG tablet Take 500 mg by mouth daily.     ? aspirin 81 mg chewable tablet Chew 1 tablet (81 mg total) daily.  0   ? atorvastatin (LIPITOR) 20 MG tablet Take 1 tablet (20 mg total) by mouth daily. 30 tablet 3   ? b complex vitamins tablet Take 1 tablet by mouth daily.     ? co-enzyme Q-10 50 mg capsule Take 100 mg by mouth daily.     ? metoprolol tartrate (LOPRESSOR) 25 MG tablet Take 12.5 mg by mouth 2 (two) times a day. Hold for SBP<95, or AP <60.     ? MULTIVIT-MINERALS/FERROUS FUM (MULTI VITAMIN ORAL) Take 1 tablet by mouth daily. Takes antioxidant formula antioxidant multiple vitamin and minerals table orally daily     ? omega-3 fatty acids 500 mg cap Take 2 capsules by mouth daily.     ? vitamin E 1000 UNIT capsule Take 1,000 Units by mouth daily.     ? acetaminophen (TYLENOL) 325 MG tablet Take 2 tablets (650 mg total) by mouth every 6 (six) hours as needed for pain or fever.  0   ? amLODIPine (NORVASC) 2.5 MG tablet Take 2.5 mg by mouth daily.     ? CHROMIUM ORAL Take 500 mcg by mouth daily.     ? ginkgo biloba 60 mg cap Take 1 capsule by mouth daily.     ? glucosamine HCl 750 mg Tab Take 1 tablet by mouth daily.     ? lecithin 1,200 mg cap Take 1,200 mg by mouth daily.     ? polyethylene glycol (MIRALAX) 17 gram packet Take 1 packet (17 g total) by mouth daily as needed.  0   ? VITS A,C,E/LUTEIN/MINERALS (VISION FORMULA, WITH LUTEIN, ORAL) Take 1 tablet by mouth daily.       No current facility-administered medications for this visit.     Allergies   Allergen Reactions   ? Sulfamethizole Unknown     Doesn't remember reaction. Happened at age 8 years old.          Lab Results    Chemistry/lipid CBC Cardiac Enzymes/BNP/TSH/INR   Lab Results   Component Value Date    CHOL 140 07/01/2020    HDL 43 07/01/2020    LDLCALC 87 07/01/2020    TRIG 51 07/01/2020    CREATININE 0.85 07/01/2020    BUN 17 07/01/2020    K 4.6 07/01/2020     07/01/2020     07/01/2020    CO2 27  07/01/2020    Lab Results   Component Value Date    WBC 8.2 04/26/2018    HGB 11.4 (L) 05/04/2018    HCT 28.1 (L) 04/26/2018    MCV 89 04/26/2018     04/27/2018    Lab Results   Component Value Date    TROPONINI 17.08 (HH) 04/17/2018    INR 1.23 (H) 04/24/2018

## 2021-06-30 NOTE — PROGRESS NOTES
Progress Notes by Malachi Bauer at 2/26/2021  1:00 PM     Author: Malachi Bauer Service: -- Author Type: Patient Access    Filed: 2/26/2021  1:52 PM Encounter Date: 2/26/2021 Status: Signed    : Malachi Bauer (Patient Access)       Parrot Study - Day 1 Call     Putting Devices On    Study Purpose:   Atrial Fibrillation Algorithm Clinical Validation Study, prospective, multi-center, non significant risk     Subject was called on 23Feb2021 for their Day 1 phone call to instructed subjects on putting on device.     Did the subject have a new reportable adverse events since last visit: No  If yes, please generate adverse event report for PI to cosign    Plan: Subject is schedule for a Day 5 phone call on 2Mar2021.     Malachi Bauer

## 2021-06-30 NOTE — PROGRESS NOTES
Progress Notes by Nini Olvera PA-C at 2/17/2021  2:30 PM     Author: Nini Olvera PA-C Service: -- Author Type: Physician Assistant    Filed: 2/17/2021  3:03 PM Encounter Date: 2/17/2021 Status: Signed    : Nini Olvera PA-C (Physician Assistant)           Study Purpose: Atrial Fibrillation Algorithm Clinical Validation Study, prospective, multi-center, non significant risk       Physical Examination performed via live video encounter   General Appearance:   Alert, well appearing,no distress, normal body habitus, upright.   HENT/Mouth: Atraumatic normocephalic membranes moist, mucous membranes pink and moist  No nasal discharge or   bleeding gums.    EYES:  No scleral icterus, normal conjunctivae   Neck: No evidence of thyromegaly.  Supple.    Chest/Lungs:   No audible wheezing equal chest wall expansion. Non labored breathing.  No cough.   Cardiovascular:   No evidence of elevated jugular venous pressure.      Abdomen:  No evidence of abdominal distention. No observed jaundice.     Extremities: No cyanosis or clubbing noted.   No visible edema bilaterally   Skin:   Skin dry, no visible ecchymosis  No markings of the bilateral wrists.   Neurologic: Mood affect appropriate to place time and person   Normal arm motion bilateral, no tremors.  No evidence of focal defect.              COVID: No symptoms, chills, shortness of breath, or difficulty breathing, muscle or body aches, headache, loss of taste or smell, sore throat, runny nose, congestion, nausea, vomiting or diarrhea.according to the [place] Department of Health and Human Services based on the CARES Act.       Nini Olvera PA-C

## 2021-06-30 NOTE — PROGRESS NOTES
Progress Notes by Apoorva Salgado at 2/17/2021  2:30 PM     Author: Apoorva Salgado Service: -- Author Type: Patient Access    Filed: 2/17/2021  2:45 PM Encounter Date: 2/17/2021 Status: Signed    : Apoorva Salgado (Patient Access)       Parrot Study Consent Note    Study Purpose: Atrial Fibrillation Algorithm Clinical Validation Study, prospective, multi-center, non-significant risk     Dilip Molumby was called today, 02/17/21, to discuss participation in the Parrot Study. The consent form version 3.0 was reviewed with subject. Subject was provided with a virtual copy of the consent form via [employer] e-consenting software.    The consent discuss included:    Study description and purpose     Qualifications for participation    Screening visit    Study procedures and follow up visits    Participant responsibilities     Study restrictions    Length of study    Study data    Potential risks and discomforts    New information    Potential benefits of participation    Incidental findings    Alternate therapies    Compensation for participation    Cost/Compensation for research related injury    Study Funding    Withdrawal participation    Confidentiality     Study contacts    Legal right    The subject was provided time to review the consent form and consider participation his questions were answered to his satisfaction. The patient has voluntarily agreed to participate in the above noted study.     The consent form version 3.0 and HIPPA form version 3.0 was signed 02/17/21 via [employer] e-consenting software. A copy of the signed consent form is delivered to patient via email from [employer] e-consent software. A copy will be placed in subject's medical record.     Past Medical History:   Diagnosis Date   ? Family history of myocardial infarction     pt adopted; unknown  Hyperlipidemia                  Hypertension  NSVT  Coronary Artery Disease  CABG  Coronary Angiogram        [unfilled]      Current Outpatient Medications:   ?  atorvastatin (LIPITOR) 20 MG tablet, Take 1 tablet (20 mg total) by mouth daily., Disp: 30 tablet, Rfl: 3  ?  Metoprolol 25mg daily    Allergies   Allergen Reactions   ? Sulfamethizole Unknown     Doesn't remember reaction. Happened at age 8 years old.        Apoorva Salgado

## 2021-06-30 NOTE — PROGRESS NOTES
"Progress Notes by Mariann Narvaez CNP at 3/2/2021  3:00 PM     Author: Mariann Narvaez CNP Service: -- Author Type: Nurse Practitioner    Filed: 3/2/2021  4:55 PM Encounter Date: 3/2/2021 Status: Signed    : Mariann Narvaez CNP (Nurse Practitioner)       Parrot Study Adverse Event Note  Study Purpose: Atrial Fibrillation Algorithm Clinical Validation Study, prospective, multi-center, non-significant risk     Date Adverse Event Occurred: 3/2/2021     Adverse Event Name: Contact Dermatitis    Adverse Event Details: Patient removed Parrot Patch per protocol note increased redness of the perimeter where the patch had been placed and light erythema with in the patch.  Patient denied itching or irritation however he noted the \"raised red boarders, that's not good\" where the patch had been placed    Relationship:  Mariann Narvaez Please provide your assessment of relationship     Was the adverse event related to the device? [x] Yes   [] No   [] Unknown       AE Severity: [] Mild  [x] Moderate  [] Severe       Relationship to study procedures: [x] Definitely related  [] Possibly related  [] Unlikely related  [] Not related       Action taken with study procedures: [] None  [] Study procedures interrupted  [x] Study procedures stopped       Serious Event? YES or NO   If YES No     Seriousness:  Death No   Life threatening  No   Initial or prolonged hospitalization No   Persistent or significant disability/incapacity No   Congenital anomaly  / birth defect No   Other medically important event No     Medication given?     Treatment:   Wash left upper chest with mild soap with warm water such as CeraVe or Ivory and cleanse area gentle.  Then pat dry and apply Hydrocortisone 1 % over the counter three times a day  Monitor for signs and symptoms of infection such as increased redness, of if drainage, increased warm, increased pain or fever, chills, or bodyaches and if these symptoms occur to notify research " office.  Follow up at next Tuesday at 2 pm on March 9, 2021       Adverse Event Status: On going stablized   Resolved / Ongoing or stabilized and followed by private MD / Subject Lost to follow up / Unresolvable

## 2021-06-30 NOTE — PROGRESS NOTES
Progress Notes by Noble Bourgeois at 3/2/2021  3:00 PM     Author: Noble Bourgeois Service: -- Author Type: Patient Access    Filed: 3/2/2021  4:55 PM Encounter Date: 3/2/2021 Status: Signed    : Noble Bourgeois (Patient Access)       Parrot Study - Day 5 Call      Removing Patch 1    Study Purpose:   Atrial Fibrillation Algorithm Clinical Validation Study, prospective, multi-center, non significant risk     Subject was called on 02-Mar-2021 for their Day 5 phone call to instructed subjects on removing device patch 1 and ship it back to sponsor and instruction on putting on device 2 patch.     However due to an adverse event a second patch was not put on and the materials were prepared for [employer] pickup the following day. Items were placed in the [employer] envelope due to the box being damaged.     Did the subject have a new reportable adverse events since last visit: Yes  If yes, please generate adverse event report for PI to cosign    Plan: Subject was discontinued from the study.  Noble Bourgeois

## 2021-06-30 NOTE — PROGRESS NOTES
Progress Notes by Apoorva Salgado at 2/17/2021  2:30 PM     Author: Apoorva Salgado Service: -- Author Type: Patient Access    Filed: 2/17/2021  3:03 PM Encounter Date: 2/17/2021 Status: Signed    : Apoorva Salgado (Patient Access)         Jose Study Inclusion / Exclusion Criteria    Study Purpose: Atrial Fibrillation Algorithm Clinical Validation Study, prospective, multi-center, non significant risk     Protocol Version V 3.0 - 10 December 2020    Subject Cohort: 2    Inclusion Criteria-all must be Yes  Yes/No Cohort Subject must meet all inclusion criteria:    Yes   All 1. Able to read, understand, and provide written informed consent.   Yes All 2. Willing and able to participate in the study procedures as described in the consent form.    Yes All 3. Be 22 years of age and older   Yes    All 4. Able to communicate effectively with and follow instructions from study staff   Yes All 5. Able to wear the wrist device for duration of study participation     NA Cohort 1 6. Have no known medical history of AF    Yes:  D Cohort 2 7. have no known medical history of AF and active diagnosis of at least one of the following arrhythmias within the past 2 years:   A. Frequent PACs, defined as at least 1% of total beats of atrial ectopic beats by 24-48 hour Holter, ambulatory ECG monitor, or implantable loop recorder)   B. Frequent PVCs, defined as at least 1% of total beats of ventricular ectopic beats by 24-48 hour Holter, ambulatory ECG monitor, or implantable loop recorder   C. SVT, which will include atrial tachycardia, atrioventricular alphonse re-entrant tachycardia, atrioventricular re-entrant tachycardia by 12-lead ECG or 24-48 hour Holter, ambulatory ECG monitor, or implantable loop recorder   D. NSVT, defined as three or more consecutive ventricular beats at a rate of at least 100 beats per minute and lasting no more than 30 seconds, by 12-lead ECG or 24-48 hour Holter, ambulatory ECG monitor, or  implantable loop recorder     NA Cohort 3 and 4 8. have a known diagnosis of AF at the time of screening (confirmed by electronic medical record (EMR) or self-report) and have had a recent episode of AF, or confirmed AF on ECG, in the past 12 months     NA Cohort 4 9. have a known diagnosis of permanent AF at the time of screening (confirmed by EMR or self-report) and have had a recent episode of AF, or confirmed AF on ECG, in the past 12 months    Yes NA 10. Meet additional binning based on demographics             Exclusion Criteria-all must be no  Yes/No Criteria # Subject must not meet any exclusion criteria:    No All 1. Physical disability that prevents safe and adequate testing.   No All 2. Mental impairment resulting in limited ability to cooperate.   No All 3. Known uncontrolled medical conditions, Such as (but not limited to) significant anemia, important electrolyte imbalance and untreated or uncontrolled thyroid disease   No All 4. Open wound(s) on the wrist and / or forearm   No All 5. Tattoos, large moles, or scars on the wrist at the wrist device location    No All 6. Skin conditions on either wrist that would preclude subject from wearing a wristband on either wrist    No All 7. Known allergy or sensitivity to medical adhesives, isopropyl alcohol, wristbands, or ECG patch.   No All 8. Medical history or physical assessment finding that makes the subjects inappropriate for participation according to investigator(s)   No All 9. Participation in a previous study that used a wrist-worn sensor device with a simultaneous ECG reference patch    No All 10. Implantable cardiac devices such as a Pacemaker or Implantable Cardioverter defibrillator    No All 11. Clinically significant hand tremors, as judged by the investigator    No All 12. Acute illness including COVID and other respiratory illnesses    No Cohort 3 and 4 13. Subject with known history of AF on a rhythm control medications with history of  complete AF rhythm control (I.e history of zero AF burden) will be excluded from Cohort 3 and 4.  (Subjects enrolled into Cohort 2 can be on rate control medications)      Subject has met all inclusion criteria and no exclusion criteria have been met.     Subject is ready to fully enrolled in the [facility] study.    Apoorva Salgado

## 2021-07-02 ENCOUNTER — RECORDS - HEALTHEAST (OUTPATIENT)
Dept: LAB | Facility: CLINIC | Age: 66
End: 2021-07-02

## 2021-07-02 LAB
CHOLEST SERPL-MCNC: 128 MG/DL
FASTING STATUS PATIENT QL REPORTED: NORMAL
HDLC SERPL-MCNC: 43 MG/DL
LDLC SERPL CALC-MCNC: 77 MG/DL
PSA SERPL-MCNC: 0.5 NG/ML (ref 0–4.5)
TRIGL SERPL-MCNC: 42 MG/DL

## 2021-10-16 ENCOUNTER — HEALTH MAINTENANCE LETTER (OUTPATIENT)
Age: 66
End: 2021-10-16

## 2022-02-22 ENCOUNTER — OFFICE VISIT (OUTPATIENT)
Dept: CARDIOLOGY | Facility: CLINIC | Age: 67
End: 2022-02-22
Payer: COMMERCIAL

## 2022-02-22 VITALS
DIASTOLIC BLOOD PRESSURE: 80 MMHG | HEART RATE: 68 BPM | WEIGHT: 236 LBS | RESPIRATION RATE: 16 BRPM | BODY MASS INDEX: 28.73 KG/M2 | OXYGEN SATURATION: 96 % | SYSTOLIC BLOOD PRESSURE: 118 MMHG

## 2022-02-22 DIAGNOSIS — Z95.1 HX OF CABG: ICD-10-CM

## 2022-02-22 DIAGNOSIS — I47.29 NSVT (NONSUSTAINED VENTRICULAR TACHYCARDIA) (H): ICD-10-CM

## 2022-02-22 DIAGNOSIS — I25.10 CORONARY ARTERY DISEASE INVOLVING NATIVE CORONARY ARTERY OF NATIVE HEART WITHOUT ANGINA PECTORIS: Primary | ICD-10-CM

## 2022-02-22 DIAGNOSIS — E78.2 MIXED HYPERLIPIDEMIA: ICD-10-CM

## 2022-02-22 DIAGNOSIS — I10 ESSENTIAL HYPERTENSION: ICD-10-CM

## 2022-02-22 PROCEDURE — 99214 OFFICE O/P EST MOD 30 MIN: CPT | Performed by: INTERNAL MEDICINE

## 2022-02-22 NOTE — LETTER
2/22/2022    Eliseo Lynn MD, MD  [facility] [address]  [address] [place] [address]    RE: Chris Redd       Dear Colleague,     I had the pleasure of seeing Chris Redd in the [facility].      Thank you, Dr. Lynn, Eliseo CALLEJAS, for asking the [facility] team to see Mr. Chris Redd to evaluate Follow Up (coronary artery disease)        Assessment/Recommendations   Assessment:    1. Coronary artery disease with prior 5v CABG (SVG to rPDA, SVG to L LEISA, radial artery to OM, SVG to diagonal branch, and LIMA to LAD - stable without angina.  2. NSVT in the setting of NSTEMI - no known recurrence since 2018  3. Hypertension - well-controlled.  4. Hyperlipidemia -  Controlled on atorvastatin 20    Plan:  1. Continue medications without changes.  2. Recommended regular exercise  3. Follow up with me in about a year or sooner if needed.    A total of 32 minutes were spent on today's encounter including 26 minutes of direct face-to-fact interaction with the patient.         History of Present Illness   Mr. Chris Redd is a 66 year old male who presents for follow-up.    Reese has a history of coronary artery disease with prior 5 vessel bypass surgery.  He also had nonsustained ventricular tachycardia in the setting of his presenting NSTEMI prior to his surgery. Today, Reese reports feeling generally.  He has not exercised but denies any symptoms of palpitations or chest discomfort.    Other than noted above, Mr. Redd denies any chest pain/pressure/tightness, shortness of breath at rest or with exertion, light headedness/dizziness, pre-syncope, syncope, lower extremity swelling, palpitations, paroxysmal nocturnal dyspnea (PND), or orthopnea.     Cardiac Problems and Cardiac Diagnostics     Most Recent Cardiac testing:  ECG dated 4/24/18 (personaly reviewed and interpreted): sinus rhythm with lateral infarct.    ECHO (report reviewed):   TTE  4/17/18  1. The left ventricle is normal in size. Left ventricular systolic performance is at the lower limits of normal. The ejection fraction is estimated to be 50%.    2. On selected views, there is mild relative hypokinesis of the posterolateral wall.   3. There is mild concentric increase in left ventricular wall thickness.  4. No significant valvular heart disease is identified on this study.   5. Normal right ventricular size and systolic performance.     Cardiac cath: 4/17/18    Estimated blood loss was <20 ml.    Mid RCA lesion 100% stenosed.    Prox RCA to Mid RCA lesion 40% stenosed.    Prox LAD lesion 50% stenosed.    Dist Cx lesion 90% stenosed.    Prox Cx lesion 40% stenosed.    Mid Cx lesion 30% stenosed.    Ost LAD to Prox LAD lesion 35% stenosed.    Ost 2nd Mrg-1 lesion 60% stenosed.    Ost 2nd Mrg-2 lesion 99% stenosed.    Ost 1st Mrg lesion 40% stenosed.       Medications  Allergies   Current Outpatient Medications   Medication Sig Dispense Refill     ascorbic acid, vitamin C, (ASCORBIC ACID WITH JUNIOR HIPS) 500 MG tablet [ASCORBIC ACID, VITAMIN C, (ASCORBIC ACID WITH JUNIOR HIPS) 500 MG TABLET] Take 500 mg by mouth daily.       aspirin 81 mg chewable tablet [ASPIRIN 81 MG CHEWABLE TABLET] Chew 1 tablet (81 mg total) daily.  0     atorvastatin (LIPITOR) 20 MG tablet [ATORVASTATIN (LIPITOR) 20 MG TABLET] Take 1 tablet (20 mg total) by mouth daily. 30 tablet 3     b complex vitamins tablet [B COMPLEX VITAMINS TABLET] Take 1 tablet by mouth daily.       co-enzyme Q-10 50 mg capsule [CO-ENZYME Q-10 50 MG CAPSULE] Take 100 mg by mouth daily.       MULTIVIT-MINERALS/FERROUS FUM (MULTI VITAMIN ORAL) [MULTIVIT-MINERALS/FERROUS FUM (MULTI VITAMIN ORAL)] Take 1 tablet by mouth daily. Takes antioxidant formula antioxidant multiple vitamin and minerals table orally daily       omega-3 fatty acids 500 mg cap [OMEGA-3 FATTY ACIDS 500 MG CAP] Take 2 capsules by mouth daily.       vitamin E 1000 UNIT capsule  [VITAMIN E 1000 UNIT CAPSULE] Take 1,000 Units by mouth daily.       acetaminophen (TYLENOL) 325 MG tablet [ACETAMINOPHEN (TYLENOL) 325 MG TABLET] Take 2 tablets (650 mg total) by mouth every 6 (six) hours as needed for pain or fever.  0      Allergies   Allergen Reactions     Sulfamethizole Unknown     Doesn't remember reaction. Happened at age 8 years old.         Physical Examination Review of Systems   Vitals: /80 (BP Location: Left arm, Patient Position: Sitting, Cuff Size: Adult Regular)   Pulse 68   Resp 16   Wt 107 kg (236 lb)   SpO2 96%   BMI 28.73 kg/m    BMI= Body mass index is 28.73 kg/m .  Wt Readings from Last 3 Encounters:   02/22/22 107 kg (236 lb)   03/20/20 102.5 kg (226 lb)   12/04/18 102.1 kg (225 lb)       General Appearance:   Pleasant male, appears stated age. no acute distress, normal body habitus   ENT/Mouth: membranes moist, no apparent gingival bleeding.      EYES:  no scleral icterus, normal conjunctivae   Neck: no carotid bruits. supple   Respiratory:   lungs are clear to auscultation, no rales or wheezing, equal chest wall expansion    Cardiovascular:   Regular rhythm, normal rate. Normal first and second heart sounds with no murmurs, rubs, or gallops; Jugular venous pressure normal, no edema bilaterally    Abdomen/GI:  Soft, non-tender   Extremities: no cyanosis or clubbing   Skin: no xanthelasma, warm.    Heme/lymph/ Immunology No apparent bleeding noted.   Neurologic: Alert and oriented. normal gait, no tremors   Psychiatric: Pleasant, calm, appropriate affect.         Please refer above for cardiac ROS details.       Past History   Past Medical History:   Past Medical History:   Diagnosis Date     Family history of myocardial infarction     pt adopted; unknown        Past Surgical History:   Past Surgical History:   Procedure Laterality Date     BYPASS GRAFT ARTERY CORONARY N/A 4/23/2018    Procedure: CORONARY ARTERY BYPASS X5, LEFT LEG ENDOSCOPIC VEIN HARVEST, LEFT  INTERNAL MAMMARY ARTERY, ANESTHESIA TRANSESOPHAGEAL ECHOCARDIOGRAM;  Surgeon: Gato Vlades MD;  Location: [facility] OR;  Service:      CV CORONARY ANGIOGRAM N/A 4/17/2018    Procedure: Coronary Angiogram;  Surgeon: Osiel Bazzi MD;  Location: [facility] Lab;  Service:         Family History: No family history on file.     Social History:   Social History     Socioeconomic History     Marital status: Single     Spouse name: Not on file     Number of children: Not on file     Years of education: Not on file     Highest education level: Not on file   Occupational History     Not on file   Tobacco Use     Smoking status: Former Smoker     Smokeless tobacco: Former User     Tobacco comment: states goes through a tin in one month   Substance and Sexual Activity     Alcohol use: No     Drug use: No     Sexual activity: Not on file   Other Topics Concern     Not on file   Social History Narrative     Not on file     Social Determinants of Health     Financial Resource Strain: Not on file   Food Insecurity: Not on file   Transportation Needs: Not on file   Physical Activity: Not on file   Stress: Not on file   Social Connections: Not on file   Intimate Partner Violence: Not on file   Housing Stability: Not on file            Lab Results    Chemistry/lipid CBC Cardiac Enzymes/BNP/TSH/INR   Lab Results   Component Value Date    CHOL 128 07/02/2021    HDL 43 07/02/2021    TRIG 42 07/02/2021    BUN 17 07/01/2020     07/01/2020    CO2 27 07/01/2020    Lab Results   Component Value Date    WBC 8.2 04/26/2018    HGB 11.4 (L) 05/04/2018    HCT 28.1 (L) 04/26/2018    MCV 89 04/26/2018     04/27/2018    Lab Results   Component Value Date    TROPONINI 17.08 (HH) 04/17/2018    INR 1.23 (H) 04/24/2018

## 2022-02-22 NOTE — PATIENT INSTRUCTIONS
It was a pleasure to meet with you today.      Below is a summary of your visit.   1. Continue your medications without changes.  2. I encourage regular exercise as tolerated. At least 30 minutes 5 days per week but more is better.  3. Follow up with me in about a year or sooner if needed.     Please do not hesitate to call the [facility] clinic with any questions or concerns at [phone]. You can also reach my nurse, Sudha, during normal business hours at [phone].    Sincerely,

## 2022-02-22 NOTE — PROGRESS NOTES
Thank you, Eliseo Gregory, for asking the [facility] team to see Mr. Chris Redd to evaluate Follow Up (coronary artery disease)        Assessment/Recommendations   Assessment:    1. Coronary artery disease with prior 5v CABG (SVG to rPDA, SVG to L LEISA, radial artery to OM, SVG to diagonal branch, and LIMA to LAD - stable without angina.  2. NSVT in the setting of NSTEMI - no known recurrence since 2018  3. Hypertension - well-controlled.  4. Hyperlipidemia -  Controlled on atorvastatin 20    Plan:  1. Continue medications without changes.  2. Recommended regular exercise  3. Follow up with me in about a year or sooner if needed.    A total of 32 minutes were spent on today's encounter including 26 minutes of direct face-to-fact interaction with the patient.         History of Present Illness   Mr. Chris Redd is a 66 year old male who presents for follow-up.    Reese has a history of coronary artery disease with prior 5 vessel bypass surgery.  He also had nonsustained ventricular tachycardia in the setting of his presenting NSTEMI prior to his surgery. Today, Reese reports feeling generally.  He has not exercised but denies any symptoms of palpitations or chest discomfort.    Other than noted above, Mr. Redd denies any chest pain/pressure/tightness, shortness of breath at rest or with exertion, light headedness/dizziness, pre-syncope, syncope, lower extremity swelling, palpitations, paroxysmal nocturnal dyspnea (PND), or orthopnea.     Cardiac Problems and Cardiac Diagnostics     Most Recent Cardiac testing:  ECG dated 4/24/18 (personaly reviewed and interpreted): sinus rhythm with lateral infarct.    ECHO (report reviewed):   TTE 4/17/18  1. The left ventricle is normal in size. Left ventricular systolic performance is at the lower limits of normal. The ejection fraction is estimated to be 50%.    2. On selected views, there is mild relative hypokinesis of the  posterolateral wall.   3. There is mild concentric increase in left ventricular wall thickness.  4. No significant valvular heart disease is identified on this study.   5. Normal right ventricular size and systolic performance.     Cardiac cath: 4/17/18    Estimated blood loss was <20 ml.    Mid RCA lesion 100% stenosed.    Prox RCA to Mid RCA lesion 40% stenosed.    Prox LAD lesion 50% stenosed.    Dist Cx lesion 90% stenosed.    Prox Cx lesion 40% stenosed.    Mid Cx lesion 30% stenosed.    Ost LAD to Prox LAD lesion 35% stenosed.    Ost 2nd Mrg-1 lesion 60% stenosed.    Ost 2nd Mrg-2 lesion 99% stenosed.    Ost 1st Mrg lesion 40% stenosed.       Medications  Allergies   Current Outpatient Medications   Medication Sig Dispense Refill     ascorbic acid, vitamin C, (ASCORBIC ACID WITH JUNIOR HIPS) 500 MG tablet [ASCORBIC ACID, VITAMIN C, (ASCORBIC ACID WITH JUNIOR HIPS) 500 MG TABLET] Take 500 mg by mouth daily.       aspirin 81 mg chewable tablet [ASPIRIN 81 MG CHEWABLE TABLET] Chew 1 tablet (81 mg total) daily.  0     atorvastatin (LIPITOR) 20 MG tablet [ATORVASTATIN (LIPITOR) 20 MG TABLET] Take 1 tablet (20 mg total) by mouth daily. 30 tablet 3     b complex vitamins tablet [B COMPLEX VITAMINS TABLET] Take 1 tablet by mouth daily.       co-enzyme Q-10 50 mg capsule [CO-ENZYME Q-10 50 MG CAPSULE] Take 100 mg by mouth daily.       MULTIVIT-MINERALS/FERROUS FUM (MULTI VITAMIN ORAL) [MULTIVIT-MINERALS/FERROUS FUM (MULTI VITAMIN ORAL)] Take 1 tablet by mouth daily. Takes antioxidant formula antioxidant multiple vitamin and minerals table orally daily       omega-3 fatty acids 500 mg cap [OMEGA-3 FATTY ACIDS 500 MG CAP] Take 2 capsules by mouth daily.       vitamin E 1000 UNIT capsule [VITAMIN E 1000 UNIT CAPSULE] Take 1,000 Units by mouth daily.       acetaminophen (TYLENOL) 325 MG tablet [ACETAMINOPHEN (TYLENOL) 325 MG TABLET] Take 2 tablets (650 mg total) by mouth every 6 (six) hours as needed for pain or fever.  0       Allergies   Allergen Reactions     Sulfamethizole Unknown     Doesn't remember reaction. Happened at age 8 years old.         Physical Examination Review of Systems   Vitals: /80 (BP Location: Left arm, Patient Position: Sitting, Cuff Size: Adult Regular)   Pulse 68   Resp 16   Wt 107 kg (236 lb)   SpO2 96%   BMI 28.73 kg/m    BMI= Body mass index is 28.73 kg/m .  Wt Readings from Last 3 Encounters:   02/22/22 107 kg (236 lb)   03/20/20 102.5 kg (226 lb)   12/04/18 102.1 kg (225 lb)       General Appearance:   Pleasant male, appears stated age. no acute distress, normal body habitus   ENT/Mouth: membranes moist, no apparent gingival bleeding.      EYES:  no scleral icterus, normal conjunctivae   Neck: no carotid bruits. supple   Respiratory:   lungs are clear to auscultation, no rales or wheezing, equal chest wall expansion    Cardiovascular:   Regular rhythm, normal rate. Normal first and second heart sounds with no murmurs, rubs, or gallops; Jugular venous pressure normal, no edema bilaterally    Abdomen/GI:  Soft, non-tender   Extremities: no cyanosis or clubbing   Skin: no xanthelasma, warm.    Heme/lymph/ Immunology No apparent bleeding noted.   Neurologic: Alert and oriented. normal gait, no tremors   Psychiatric: Pleasant, calm, appropriate affect.         Please refer above for cardiac ROS details.       Past History   Past Medical History:   Past Medical History:   Diagnosis Date     Family history of myocardial infarction     pt adopted; unknown        Past Surgical History:   Past Surgical History:   Procedure Laterality Date     BYPASS GRAFT ARTERY CORONARY N/A 4/23/2018    Procedure: CORONARY ARTERY BYPASS X5, LEFT LEG ENDOSCOPIC VEIN HARVEST, LEFT INTERNAL MAMMARY ARTERY, ANESTHESIA TRANSESOPHAGEAL ECHOCARDIOGRAM;  Surgeon: Gato Valdes MD;  Location: [facility] OR;  Service:      CV CORONARY ANGIOGRAM N/A 4/17/2018    Procedure: Coronary Angiogram;  Surgeon: Osiel VENEGAS  MD Jluis;  Location: [facility];  Service:         Family History: No family history on file.     Social History:   Social History     Socioeconomic History     Marital status: Single     Spouse name: Not on file     Number of children: Not on file     Years of education: Not on file     Highest education level: Not on file   Occupational History     Not on file   Tobacco Use     Smoking status: Former Smoker     Smokeless tobacco: Former User     Tobacco comment: states goes through a tin in one month   Substance and Sexual Activity     Alcohol use: No     Drug use: No     Sexual activity: Not on file   Other Topics Concern     Not on file   Social History Narrative     Not on file     Social Determinants of Health     Financial Resource Strain: Not on file   Food Insecurity: Not on file   Transportation Needs: Not on file   Physical Activity: Not on file   Stress: Not on file   Social Connections: Not on file   Intimate Partner Violence: Not on file   Housing Stability: Not on file            Lab Results    Chemistry/lipid CBC Cardiac Enzymes/BNP/TSH/INR   Lab Results   Component Value Date    CHOL 128 07/02/2021    HDL 43 07/02/2021    TRIG 42 07/02/2021    BUN 17 07/01/2020     07/01/2020    CO2 27 07/01/2020    Lab Results   Component Value Date    WBC 8.2 04/26/2018    HGB 11.4 (L) 05/04/2018    HCT 28.1 (L) 04/26/2018    MCV 89 04/26/2018     04/27/2018    Lab Results   Component Value Date    TROPONINI 17.08 (HH) 04/17/2018    INR 1.23 (H) 04/24/2018

## 2022-07-23 ENCOUNTER — HEALTH MAINTENANCE LETTER (OUTPATIENT)
Age: 67
End: 2022-07-23

## 2022-08-25 ENCOUNTER — LAB REQUISITION (OUTPATIENT)
Dept: LAB | Facility: CLINIC | Age: 67
End: 2022-08-25
Payer: COMMERCIAL

## 2022-08-25 ENCOUNTER — TRANSFERRED RECORDS (OUTPATIENT)
Dept: HEALTH INFORMATION MANAGEMENT | Facility: CLINIC | Age: 67
End: 2022-08-25

## 2022-08-25 DIAGNOSIS — Z12.5 ENCOUNTER FOR SCREENING FOR MALIGNANT NEOPLASM OF PROSTATE: ICD-10-CM

## 2022-08-25 DIAGNOSIS — I10 ESSENTIAL (PRIMARY) HYPERTENSION: ICD-10-CM

## 2022-08-25 DIAGNOSIS — E78.2 MIXED HYPERLIPIDEMIA: ICD-10-CM

## 2022-08-25 LAB
ANION GAP SERPL CALCULATED.3IONS-SCNC: 9 MMOL/L (ref 7–15)
BUN SERPL-MCNC: 15.6 MG/DL (ref 8–23)
CALCIUM SERPL-MCNC: 9.6 MG/DL (ref 8.8–10.2)
CHLORIDE SERPL-SCNC: 106 MMOL/L (ref 98–107)
CHOLEST SERPL-MCNC: 139 MG/DL
CREAT SERPL-MCNC: 0.83 MG/DL (ref 0.67–1.17)
DEPRECATED HCO3 PLAS-SCNC: 28 MMOL/L (ref 22–29)
GFR SERPL CREATININE-BSD FRML MDRD: >90 ML/MIN/1.73M2
GLUCOSE SERPL-MCNC: 96 MG/DL (ref 70–99)
HDLC SERPL-MCNC: 43 MG/DL
LDLC SERPL CALC-MCNC: 84 MG/DL
NONHDLC SERPL-MCNC: 96 MG/DL
POTASSIUM SERPL-SCNC: 5.1 MMOL/L (ref 3.4–5.3)
PSA SERPL-MCNC: 0.69 NG/ML (ref 0–4.5)
SODIUM SERPL-SCNC: 143 MMOL/L (ref 136–145)
TRIGL SERPL-MCNC: 60 MG/DL

## 2022-08-25 PROCEDURE — 80061 LIPID PANEL: CPT | Mod: ORL | Performed by: NURSE PRACTITIONER

## 2022-08-25 PROCEDURE — 80048 BASIC METABOLIC PNL TOTAL CA: CPT | Mod: ORL | Performed by: NURSE PRACTITIONER

## 2022-08-25 PROCEDURE — G0103 PSA SCREENING: HCPCS | Mod: ORL | Performed by: NURSE PRACTITIONER

## 2022-10-01 ENCOUNTER — HEALTH MAINTENANCE LETTER (OUTPATIENT)
Age: 67
End: 2022-10-01

## 2023-02-03 ENCOUNTER — OFFICE VISIT (OUTPATIENT)
Dept: CARDIOLOGY | Facility: CLINIC | Age: 68
End: 2023-02-03
Attending: INTERNAL MEDICINE
Payer: COMMERCIAL

## 2023-02-03 VITALS
DIASTOLIC BLOOD PRESSURE: 60 MMHG | OXYGEN SATURATION: 97 % | WEIGHT: 228 LBS | SYSTOLIC BLOOD PRESSURE: 92 MMHG | RESPIRATION RATE: 16 BRPM | HEART RATE: 82 BPM | BODY MASS INDEX: 27.75 KG/M2

## 2023-02-03 DIAGNOSIS — I10 ESSENTIAL HYPERTENSION: ICD-10-CM

## 2023-02-03 DIAGNOSIS — I25.10 CORONARY ARTERY DISEASE INVOLVING NATIVE CORONARY ARTERY OF NATIVE HEART WITHOUT ANGINA PECTORIS: ICD-10-CM

## 2023-02-03 DIAGNOSIS — I47.29 NSVT (NONSUSTAINED VENTRICULAR TACHYCARDIA) (H): ICD-10-CM

## 2023-02-03 DIAGNOSIS — E78.2 MIXED HYPERLIPIDEMIA: ICD-10-CM

## 2023-02-03 DIAGNOSIS — Z95.1 HX OF CABG: ICD-10-CM

## 2023-02-03 PROCEDURE — 99214 OFFICE O/P EST MOD 30 MIN: CPT | Performed by: INTERNAL MEDICINE

## 2023-02-03 NOTE — PROGRESS NOTES
[facility] HEART CARE   [address]  [place] [address]   [email or web address]   OFFICE: [phone]     CARDIOLOGY CLINIC NOTE     Thank you, Eliseo Gregory, for asking the [facility] Heart Care team to see Mr. Chris Redd to Follow Up (Coronary artery disease)        Assessment/Recommendations   Assessment:    1. Coronary artery disease with prior 5v CABG (SVG to rPDA, SVG to L LEISA, radial artery to OM, SVG to diagonal branch, and LIMA to LAD - stable without angina.  2. NSVT in the setting of NSTEMI - no known recurrence since 2018  3. Hypertension - well-controlled.  4. Hyperlipidemia -  Controlled on atorvastatin 20    Plan:  1. Continue medications without changes.  2. Recommended regular exercise  3. Consider stress test next year before participating in the bike across [place].  4. Follow up with me in about a year or sooner if needed.           History of Present Illness   Mr. Chris Redd is a 66 year old male who presents for follow-up.    Reese has a history of coronary artery disease with prior 5 vessel bypass surgery in 2018.  He also had nonsustained ventricular tachycardia in the setting of his presenting NSTEMI prior to his surgery.      Reese is feeling well today. He again admits that he is not exercising regularly but wants to start training for a bike across [place] that he hopes to ride in 2024. He denies exertional dyspnea or anginal symptoms.      Other than noted above, Mr. Redd denies any chest pain/pressure/tightness, shortness of breath at rest or with exertion, light headedness/dizziness, pre-syncope, syncope, lower extremity swelling, palpitations, paroxysmal nocturnal dyspnea (PND), or orthopnea.     Cardiac Problems and Cardiac Diagnostics     Most Recent Cardiac testing:  ECG dated 4/24/18 (personaly reviewed and interpreted): sinus rhythm with lateral infarct.    ECHO (report reviewed):   TTE 4/17/18  1. The left  ventricle is normal in size. Left ventricular systolic performance is at the lower limits of normal. The ejection fraction is estimated to be 50%.    2. On selected views, there is mild relative hypokinesis of the posterolateral wall.   3. There is mild concentric increase in left ventricular wall thickness.  4. No significant valvular heart disease is identified on this study.   5. Normal right ventricular size and systolic performance.     Cardiac cath: 4/17/18    Estimated blood loss was <20 ml.    Mid RCA lesion 100% stenosed.    Prox RCA to Mid RCA lesion 40% stenosed.    Prox LAD lesion 50% stenosed.    Dist Cx lesion 90% stenosed.    Prox Cx lesion 40% stenosed.    Mid Cx lesion 30% stenosed.    Ost LAD to Prox LAD lesion 35% stenosed.    Ost 2nd Mrg-1 lesion 60% stenosed.    Ost 2nd Mrg-2 lesion 99% stenosed.    Ost 1st Mrg lesion 40% stenosed.       Medications  Allergies   Current Outpatient Medications   Medication Sig Dispense Refill     acetaminophen (TYLENOL) 325 MG tablet [ACETAMINOPHEN (TYLENOL) 325 MG TABLET] Take 2 tablets (650 mg total) by mouth every 6 (six) hours as needed for pain or fever.  0     ascorbic acid, vitamin C, (ASCORBIC ACID WITH JUNIOR HIPS) 500 MG tablet [ASCORBIC ACID, VITAMIN C, (ASCORBIC ACID WITH JUNIOR HIPS) 500 MG TABLET] Take 500 mg by mouth daily.       aspirin 81 mg chewable tablet [ASPIRIN 81 MG CHEWABLE TABLET] Chew 1 tablet (81 mg total) daily.  0     atorvastatin (LIPITOR) 20 MG tablet [ATORVASTATIN (LIPITOR) 20 MG TABLET] Take 1 tablet (20 mg total) by mouth daily. 30 tablet 3     b complex vitamins tablet [B COMPLEX VITAMINS TABLET] Take 1 tablet by mouth daily.       MULTIVIT-MINERALS/FERROUS FUM (MULTI VITAMIN ORAL) [MULTIVIT-MINERALS/FERROUS FUM (MULTI VITAMIN ORAL)] Take 1 tablet by mouth daily. Takes antioxidant formula antioxidant multiple vitamin and minerals table orally daily        Allergies   Allergen Reactions     Sulfamethizole Unknown     Doesn't remember  reaction. Happened at age 8 years old.         Physical Examination Review of Systems   Vitals: BP 92/60 (BP Location: Left arm, Patient Position: Sitting, Cuff Size: Adult Regular)   Pulse 82   Resp 16   Wt 103.4 kg (228 lb)   SpO2 97%   BMI 27.75 kg/m    BMI= Body mass index is 27.75 kg/m .  Wt Readings from Last 3 Encounters:   02/03/23 103.4 kg (228 lb)   02/22/22 107 kg (236 lb)   03/20/20 102.5 kg (226 lb)       General Appearance:   Pleasant male, appears stated age. no acute distress, normal body habitus   ENT/Mouth: membranes moist, no apparent gingival bleeding.      EYES:  no scleral icterus, normal conjunctivae   Neck: supple   Respiratory:   lungs are clear to auscultation, no rales or wheezing, equal chest wall expansion    Cardiovascular:   Regular rhythm, normal rate. Normal first and second heart sounds with no murmurs, rubs, or gallops; Jugular venous pressure normal, no edema bilaterally    Extremities: no cyanosis or clubbing   Skin: no xanthelasma, warm.    Heme/lymph/ Immunology No apparent bleeding noted.   Neurologic: Alert and oriented. normal gait, no tremors   Psychiatric: Pleasant, calm, appropriate affect.         Please refer above for cardiac ROS details.       Past History   Past Medical History:   Past Medical History:   Diagnosis Date     Family history of myocardial infarction     pt adopted; unknown        Past Surgical History:   Past Surgical History:   Procedure Laterality Date     BYPASS GRAFT ARTERY CORONARY N/A 4/23/2018    Procedure: CORONARY ARTERY BYPASS X5, LEFT LEG ENDOSCOPIC VEIN HARVEST, LEFT INTERNAL MAMMARY ARTERY, ANESTHESIA TRANSESOPHAGEAL ECHOCARDIOGRAM;  Surgeon: Gato Valdes MD;  Location: [facility] OR;  Service:      CV CORONARY ANGIOGRAM N/A 4/17/2018    Procedure: Coronary Angiogram;  Surgeon: Osiel Bazzi MD;  Location: [facility] Cath Lab;  Service:         Family History: No family history on file.     Social History:   Social  History     Socioeconomic History     Marital status: Single     Spouse name: Not on file     Number of children: Not on file     Years of education: Not on file     Highest education level: Not on file   Occupational History     Not on file   Tobacco Use     Smoking status: Former     Smokeless tobacco: Former     Tobacco comments:     states goes through a tin in one month   Substance and Sexual Activity     Alcohol use: No     Drug use: No     Sexual activity: Not on file   Other Topics Concern     Not on file   Social History Narrative     Not on file     Social Determinants of Health     Financial Resource Strain: Not on file   Food Insecurity: Not on file   Transportation Needs: Not on file   Physical Activity: Not on file   Stress: Not on file   Social Connections: Not on file   Intimate Partner Violence: Not on file   Housing Stability: Not on file            Lab Results    Chemistry/lipid CBC Cardiac Enzymes/BNP/TSH/INR   Lab Results   Component Value Date    CHOL 139 08/25/2022    HDL 43 08/25/2022    TRIG 60 08/25/2022    BUN 15.6 08/25/2022     08/25/2022    CO2 28 08/25/2022    Lab Results   Component Value Date    WBC 8.2 04/26/2018    HGB 11.4 (L) 05/04/2018    HCT 28.1 (L) 04/26/2018    MCV 89 04/26/2018     04/27/2018    Lab Results   Component Value Date    TROPONINI 17.08 (HH) 04/17/2018    INR 1.23 (H) 04/24/2018

## 2023-02-03 NOTE — PATIENT INSTRUCTIONS
It was a pleasure to meet with you today.      Below is a summary of your visit.   Continue your medications without changes.  I encourage regular exercise. It is not too early to start training for that bike ride across [place].  Follow up with me in a year or sooner if needed.     Please do not hesitate to call the [facility] with any questions or concerns at [phone].     Sincerely,

## 2023-02-03 NOTE — LETTER
2/3/2023    Eliseo Lynn MD  [address]  [address] [place] [address]    RE: Chris Bensonmag       Dear Colleague,     I had the pleasure of seeing Chris Redd in the [facility].    [facility] HEART CARE   [address]  [place] [address]   [email or web address]   OFFICE: [phone]     CARDIOLOGY CLINIC NOTE     Thank you, Dr. Lynn, Eliseo CALLEJAS, for asking the [facility] Heart Care team to see Mr. Chris Redd to Follow Up (Coronary artery disease)        Assessment/Recommendations   Assessment:    1. Coronary artery disease with prior 5v CABG (SVG to rPDA, SVG to L LEISA, radial artery to OM, SVG to diagonal branch, and LIMA to LAD - stable without angina.  2. NSVT in the setting of NSTEMI - no known recurrence since 2018  3. Hypertension - well-controlled.  4. Hyperlipidemia -  Controlled on atorvastatin 20    Plan:  1. Continue medications without changes.  2. Recommended regular exercise  3. Consider stress test next year before participating in the bike across [place].  4. Follow up with me in about a year or sooner if needed.           History of Present Illness   Mr. Chris Redd is a 66 year old male who presents for follow-up.    Reese has a history of coronary artery disease with prior 5 vessel bypass surgery in 2018.  He also had nonsustained ventricular tachycardia in the setting of his presenting NSTEMI prior to his surgery.      Reese is feeling well today. He again admits that he is not exercising regularly but wants to start training for a bike across [place] that he hopes to ride in 2024. He denies exertional dyspnea or anginal symptoms.      Other than noted above, Mr. Redd denies any chest pain/pressure/tightness, shortness of breath at rest or with exertion, light headedness/dizziness, pre-syncope, syncope, lower extremity swelling, palpitations, paroxysmal nocturnal dyspnea (PND), or orthopnea.     Cardiac  Problems and Cardiac Diagnostics     Most Recent Cardiac testing:  ECG dated 4/24/18 (personaly reviewed and interpreted): sinus rhythm with lateral infarct.    ECHO (report reviewed):   TTE 4/17/18  1. The left ventricle is normal in size. Left ventricular systolic performance is at the lower limits of normal. The ejection fraction is estimated to be 50%.    2. On selected views, there is mild relative hypokinesis of the posterolateral wall.   3. There is mild concentric increase in left ventricular wall thickness.  4. No significant valvular heart disease is identified on this study.   5. Normal right ventricular size and systolic performance.     Cardiac cath: 4/17/18    Estimated blood loss was <20 ml.    Mid RCA lesion 100% stenosed.    Prox RCA to Mid RCA lesion 40% stenosed.    Prox LAD lesion 50% stenosed.    Dist Cx lesion 90% stenosed.    Prox Cx lesion 40% stenosed.    Mid Cx lesion 30% stenosed.    Ost LAD to Prox LAD lesion 35% stenosed.    Ost 2nd Mrg-1 lesion 60% stenosed.    Ost 2nd Mrg-2 lesion 99% stenosed.    Ost 1st Mrg lesion 40% stenosed.       Medications  Allergies   Current Outpatient Medications   Medication Sig Dispense Refill     acetaminophen (TYLENOL) 325 MG tablet [ACETAMINOPHEN (TYLENOL) 325 MG TABLET] Take 2 tablets (650 mg total) by mouth every 6 (six) hours as needed for pain or fever.  0     ascorbic acid, vitamin C, (ASCORBIC ACID WITH JUNIOR HIPS) 500 MG tablet [ASCORBIC ACID, VITAMIN C, (ASCORBIC ACID WITH JUNIOR HIPS) 500 MG TABLET] Take 500 mg by mouth daily.       aspirin 81 mg chewable tablet [ASPIRIN 81 MG CHEWABLE TABLET] Chew 1 tablet (81 mg total) daily.  0     atorvastatin (LIPITOR) 20 MG tablet [ATORVASTATIN (LIPITOR) 20 MG TABLET] Take 1 tablet (20 mg total) by mouth daily. 30 tablet 3     b complex vitamins tablet [B COMPLEX VITAMINS TABLET] Take 1 tablet by mouth daily.       MULTIVIT-MINERALS/FERROUS FUM (MULTI VITAMIN ORAL) [MULTIVIT-MINERALS/FERROUS FUM (MULTI  VITAMIN ORAL)] Take 1 tablet by mouth daily. Takes antioxidant formula antioxidant multiple vitamin and minerals table orally daily        Allergies   Allergen Reactions     Sulfamethizole Unknown     Doesn't remember reaction. Happened at age 8 years old.         Physical Examination Review of Systems   Vitals: BP 92/60 (BP Location: Left arm, Patient Position: Sitting, Cuff Size: Adult Regular)   Pulse 82   Resp 16   Wt 103.4 kg (228 lb)   SpO2 97%   BMI 27.75 kg/m    BMI= Body mass index is 27.75 kg/m .  Wt Readings from Last 3 Encounters:   02/03/23 103.4 kg (228 lb)   02/22/22 107 kg (236 lb)   03/20/20 102.5 kg (226 lb)       General Appearance:   Pleasant male, appears stated age. no acute distress, normal body habitus   ENT/Mouth: membranes moist, no apparent gingival bleeding.      EYES:  no scleral icterus, normal conjunctivae   Neck: supple   Respiratory:   lungs are clear to auscultation, no rales or wheezing, equal chest wall expansion    Cardiovascular:   Regular rhythm, normal rate. Normal first and second heart sounds with no murmurs, rubs, or gallops; Jugular venous pressure normal, no edema bilaterally    Extremities: no cyanosis or clubbing   Skin: no xanthelasma, warm.    Heme/lymph/ Immunology No apparent bleeding noted.   Neurologic: Alert and oriented. normal gait, no tremors   Psychiatric: Pleasant, calm, appropriate affect.         Please refer above for cardiac ROS details.       Past History   Past Medical History:   Past Medical History:   Diagnosis Date     Family history of myocardial infarction     pt adopted; unknown        Past Surgical History:   Past Surgical History:   Procedure Laterality Date     BYPASS GRAFT ARTERY CORONARY N/A 4/23/2018    Procedure: CORONARY ARTERY BYPASS X5, LEFT LEG ENDOSCOPIC VEIN HARVEST, LEFT INTERNAL MAMMARY ARTERY, ANESTHESIA TRANSESOPHAGEAL ECHOCARDIOGRAM;  Surgeon: Gato Valdes MD;  Location: [facility];  Service:      CV  CORONARY ANGIOGRAM N/A 4/17/2018    Procedure: Coronary Angiogram;  Surgeon: Osiel Bazzi MD;  Location: [facility];  Service:         Family History: No family history on file.     Social History:   Social History     Socioeconomic History     Marital status: Single     Spouse name: Not on file     Number of children: Not on file     Years of education: Not on file     Highest education level: Not on file   Occupational History     Not on file   Tobacco Use     Smoking status: Former     Smokeless tobacco: Former     Tobacco comments:     states goes through a tin in one month   Substance and Sexual Activity     Alcohol use: No     Drug use: No     Sexual activity: Not on file   Other Topics Concern     Not on file   Social History Narrative     Not on file     Social Determinants of Health     Financial Resource Strain: Not on file   Food Insecurity: Not on file   Transportation Needs: Not on file   Physical Activity: Not on file   Stress: Not on file   Social Connections: Not on file   Intimate Partner Violence: Not on file   Housing Stability: Not on file            Lab Results    Chemistry/lipid CBC Cardiac Enzymes/BNP/TSH/INR   Lab Results   Component Value Date    CHOL 139 08/25/2022    HDL 43 08/25/2022    TRIG 60 08/25/2022    BUN 15.6 08/25/2022     08/25/2022    CO2 28 08/25/2022    Lab Results   Component Value Date    WBC 8.2 04/26/2018    HGB 11.4 (L) 05/04/2018    HCT 28.1 (L) 04/26/2018    MCV 89 04/26/2018     04/27/2018    Lab Results   Component Value Date    TROPONINI 17.08 (HH) 04/17/2018    INR 1.23 (H) 04/24/2018                Thank you for allowing me to participate in the care of your patient.      Sincerely,     Dennis Calhoun MD     [facility]  cc:   Dennis Calhoun MD  [address]  [place] [address]

## 2023-03-28 NOTE — PROGRESS NOTES
Problem: Adult Inpatient Plan of Care  Goal: Plan of Care Review  Recent Flowsheet Documentation  Taken 3/28/2023 1037 by Blanca Marie PT  Plan of Care Reviewed With: patient  Outcome Evaluation: PT evaluation completed as co-eval with OT. Pt awake, alert, in recliner, and agreeable to therapy. Patient transferred sit to stand to sit with CGA and ambulated 8ft with FWW and CGA. Patient tolerated standing ~ 2 minutes.m Patient reported lightheadedness at times that improved with time.HR initially 104bpm and fluctuated from 99 to 117 bpm during session. Ending  bpm with patient reclined in sitting. O2 sats remained in 90's on 3lpm. function limited by decreased strength, balance, and tolerance for functional mobility and activities. Patient will benefit from PT to improve strength, balance, and tolerance to improve transfer and gait ability and decrease fall risk as patient improves medically. Anticipate home with assist at discharge with HHPT.   Goal Outcome Evaluation:  Plan of Care Reviewed With: patient           Outcome Evaluation: PT evaluation completed as co-eval with OT. Pt awake, alert, in recliner, and agreeable to therapy. Patient transferred sit to stand to sit with CGA and ambulated 8ft with FWW and CGA. Patient tolerated standing ~ 2 minutes.m Patient reported lightheadedness at times that improved with time.HR initially 104bpm and fluctuated from 99 to 117 bpm during session. Ending  bpm with patient reclined in sitting. O2 sats remained in 90's on 3lpm. function limited by decreased strength, balance, and tolerance for functional mobility and activities. Patient will benefit from PT to improve strength, balance, and tolerance to improve transfer and gait ability and decrease fall risk as patient improves medically. Anticipate home with assist at discharge with HHPT.   Progress Notes by Noble Bourgeois at 2/19/2021  9:00 AM     Author: Noble Bourgeois Service: -- Author Type: Patient Access    Filed: 2/19/2021  9:32 AM Encounter Date: 2/19/2021 Status: Signed    : Noble Bourgeois (Patient Access)       Parrot Study - Day 1 Call     Putting Devices On    Study Purpose:   Atrial Fibrillation Algorithm Clinical Validation Study, prospective, multi-center, non significant risk     Subject was called on 19-Feb-2021 for their Day 1 phone call to instructed subjects on putting on device.     Did the subject have a new reportable adverse events since last visit: No    However, the subject did not receive the box and thus could not complete this step of the study at this time.    Plan: Subject is scheduled for another Day 1 phone call on 26-Feb-2021 at 1:00PM     Noble Bourgeois

## 2023-10-15 ENCOUNTER — HEALTH MAINTENANCE LETTER (OUTPATIENT)
Age: 68
End: 2023-10-15

## 2023-11-15 ENCOUNTER — TRANSFERRED RECORDS (OUTPATIENT)
Dept: HEALTH INFORMATION MANAGEMENT | Facility: CLINIC | Age: 68
End: 2023-11-15

## 2023-11-15 ENCOUNTER — LAB REQUISITION (OUTPATIENT)
Dept: LAB | Facility: CLINIC | Age: 68
End: 2023-11-15
Payer: COMMERCIAL

## 2023-11-15 DIAGNOSIS — Z12.5 ENCOUNTER FOR SCREENING FOR MALIGNANT NEOPLASM OF PROSTATE: ICD-10-CM

## 2023-11-15 DIAGNOSIS — I10 ESSENTIAL (PRIMARY) HYPERTENSION: ICD-10-CM

## 2023-11-15 DIAGNOSIS — E78.2 MIXED HYPERLIPIDEMIA: ICD-10-CM

## 2023-11-15 LAB
ANION GAP SERPL CALCULATED.3IONS-SCNC: 12 MMOL/L (ref 7–15)
BUN SERPL-MCNC: 18.5 MG/DL (ref 8–23)
CALCIUM SERPL-MCNC: 8.8 MG/DL (ref 8.8–10.2)
CHLORIDE SERPL-SCNC: 108 MMOL/L (ref 98–107)
CHOLEST SERPL-MCNC: 114 MG/DL
CREAT SERPL-MCNC: 0.78 MG/DL (ref 0.67–1.17)
DEPRECATED HCO3 PLAS-SCNC: 24 MMOL/L (ref 22–29)
EGFRCR SERPLBLD CKD-EPI 2021: >90 ML/MIN/1.73M2
GLUCOSE SERPL-MCNC: 103 MG/DL (ref 70–99)
HDLC SERPL-MCNC: 37 MG/DL
LDLC SERPL CALC-MCNC: 68 MG/DL
NONHDLC SERPL-MCNC: 77 MG/DL
POTASSIUM SERPL-SCNC: 3.9 MMOL/L (ref 3.4–5.3)
PSA SERPL DL<=0.01 NG/ML-MCNC: 0.66 NG/ML (ref 0–4.5)
SODIUM SERPL-SCNC: 144 MMOL/L (ref 135–145)
TRIGL SERPL-MCNC: 44 MG/DL

## 2023-11-15 PROCEDURE — G0103 PSA SCREENING: HCPCS | Mod: ORL | Performed by: NURSE PRACTITIONER

## 2023-11-15 PROCEDURE — 80061 LIPID PANEL: CPT | Mod: ORL | Performed by: NURSE PRACTITIONER

## 2023-11-15 PROCEDURE — 80048 BASIC METABOLIC PNL TOTAL CA: CPT | Mod: ORL | Performed by: NURSE PRACTITIONER

## 2024-01-31 ENCOUNTER — OFFICE VISIT (OUTPATIENT)
Dept: CARDIOLOGY | Facility: CLINIC | Age: 69
End: 2024-01-31
Attending: INTERNAL MEDICINE
Payer: COMMERCIAL

## 2024-01-31 VITALS
SYSTOLIC BLOOD PRESSURE: 150 MMHG | DIASTOLIC BLOOD PRESSURE: 90 MMHG | HEIGHT: 75 IN | HEART RATE: 64 BPM | BODY MASS INDEX: 28.75 KG/M2 | WEIGHT: 231.2 LBS | OXYGEN SATURATION: 16 %

## 2024-01-31 DIAGNOSIS — I10 ESSENTIAL HYPERTENSION: ICD-10-CM

## 2024-01-31 DIAGNOSIS — Z95.1 HX OF CABG: ICD-10-CM

## 2024-01-31 DIAGNOSIS — I25.10 CORONARY ARTERY DISEASE INVOLVING NATIVE CORONARY ARTERY OF NATIVE HEART WITHOUT ANGINA PECTORIS: ICD-10-CM

## 2024-01-31 DIAGNOSIS — E78.2 MIXED HYPERLIPIDEMIA: ICD-10-CM

## 2024-01-31 DIAGNOSIS — I47.29 NSVT (NONSUSTAINED VENTRICULAR TACHYCARDIA) (H): ICD-10-CM

## 2024-01-31 PROCEDURE — 99214 OFFICE O/P EST MOD 30 MIN: CPT | Performed by: INTERNAL MEDICINE

## 2024-01-31 NOTE — PATIENT INSTRUCTIONS
It was a pleasure to meet with you today.      Below is a summary of your visit.   Continue your medications without changes.   I encourage regular exercise that consists of aerobic and resistance training.  If your blood pressure continues to be high, you will need to start medication to bring it down.  Follow up with me again in a year or sooner if needed     Please do not hesitate to call the [facility] with any questions or concerns at [phone]. You can also reach my nurse, Yisel, at [phone].    Sincerely,       
Loyda Hunter(Resident)

## 2024-01-31 NOTE — LETTER
1/31/2024    Rebeca Galvin, CNP  [address]  [facility] [address]    RE: Chris CALLEJAS Tramaine       Dear Colleague,     I had the pleasure of seeing Chris Redd in the [facility].    [facility]   [address]  [place] [address]   [email or web address]   OFFICE: [phone]     CARDIOLOGY CLINIC NOTE     Thank you, Rebeca Hope, for asking the [facility] team to see Mr. Chris Redd to evaluate Follow Up (Coronary artery disease)        Assessment/Recommendations   Assessment:    Coronary artery disease with prior 5v CABG (SVG to rPDA, SVG to L LEISA, radial artery to OM, SVG to diagonal branch, and LIMA to LAD - stable without angina.  NSVT in the setting of NSTEMI - no known recurrence since 2018  Hypertension - high today. Generally controlled when he checks it at home.  Hyperlipidemia -  Controlled on atorvastatin 20    Plan:  Lifestyle modifications for BP. If BP remains elevated would start losartan  Continue other medications without changes. Aspirin for CAD and atorvastatin for lipid management  Advised regular exercise  Follow up with me in a year or sooner if needed         History of Present Illness   Mr. Chris Redd is a 68 year old male who presents for follow-up.     Reese has a history of coronary artery disease with prior 5 vessel bypass surgery in 2018.  He also had nonsustained ventricular tachycardia in the setting of his presenting NSTEMI prior to his surgery.     Reese is feeling generally well today. He has no new cardiac symptoms. Not exercising regularly.     Other than noted above, Mr. Redd denies any chest pain/pressure/tightness, shortness of breath at rest or with exertion, light headedness/dizziness, pre-syncope, syncope, lower extremity swelling, palpitations, paroxysmal nocturnal dyspnea (PND), or orthopnea.     Cardiac Problems and Cardiac Diagnostics     Most  Recent Cardiac testing:  ECG dated 4/24/18 (personaly reviewed and interpreted): sinus rhythm with possible lateral infarct.    ECHO (report reviewed):   TTE 4/17/18  1. The left ventricle is normal in size. Left ventricular systolic performance is at the lower limits of normal. The ejection fraction is estimated to be 50%.    2. On selected views, there is mild relative hypokinesis of the posterolateral wall.   3. There is mild concentric increase in left ventricular wall thickness.  4. No significant valvular heart disease is identified on this study.   5. Normal right ventricular size and systolic performance.      Cardiac cath: 4/17/18  Estimated blood loss was <20 ml.  Mid RCA lesion 100% stenosed.  Prox RCA to Mid RCA lesion 40% stenosed.  Prox LAD lesion 50% stenosed.  Dist Cx lesion 90% stenosed.  Prox Cx lesion 40% stenosed.  Mid Cx lesion 30% stenosed.  Ost LAD to Prox LAD lesion 35% stenosed.  Ost 2nd Mrg-1 lesion 60% stenosed.  Ost 2nd Mrg-2 lesion 99% stenosed.  Ost 1st Mrg lesion 40% stenosed.         Medications  Allergies   Current Outpatient Medications   Medication Sig Dispense Refill    ascorbic acid, vitamin C, (ASCORBIC ACID WITH JUNIOR HIPS) 500 MG tablet [ASCORBIC ACID, VITAMIN C, (ASCORBIC ACID WITH JUNIOR HIPS) 500 MG TABLET] Take 500 mg by mouth daily.      aspirin 81 mg chewable tablet [ASPIRIN 81 MG CHEWABLE TABLET] Chew 1 tablet (81 mg total) daily.  0    atorvastatin (LIPITOR) 20 MG tablet [ATORVASTATIN (LIPITOR) 20 MG TABLET] Take 1 tablet (20 mg total) by mouth daily. 30 tablet 3    b complex vitamins tablet [B COMPLEX VITAMINS TABLET] Take 1 tablet by mouth daily.      MULTIVIT-MINERALS/FERROUS FUM (MULTI VITAMIN ORAL) [MULTIVIT-MINERALS/FERROUS FUM (MULTI VITAMIN ORAL)] Take 1 tablet by mouth daily. Takes antioxidant formula antioxidant multiple vitamin and minerals table orally daily        Allergies   Allergen Reactions    Sulfamethizole Unknown     Doesn't remember reaction. Happened  "at age 8 years old.         Physical Examination Review of Systems   Vitals: BP (!) 150/90   Pulse 64   Ht 1.905 m (6' 3\")   Wt 104.9 kg (231 lb 3.2 oz)   SpO2 (!) 16%   BMI 28.90 kg/m    BMI= Body mass index is 28.9 kg/m .  Wt Readings from Last 3 Encounters:   01/31/24 104.9 kg (231 lb 3.2 oz)   02/03/23 103.4 kg (228 lb)   02/22/22 107 kg (236 lb)       General: pleasant male. No acute distress.   HENT: external ears normal. Nares patent. Mucous membranes moist.  Eyes: perrla, extraocular muscles intact. No scleral icterus.   Neck: No JVD  Lungs: clear to auscultation  COR: regular rate and rhythm, No murmurs, rubs, or gallops  Abd: nondistended, soft  Extrem: No edema        Please refer above for cardiac ROS details.       Past History   Past Medical History:   Past Medical History:   Diagnosis Date    Family history of myocardial infarction     pt adopted; unknown        Past Surgical History:   Past Surgical History:   Procedure Laterality Date    BYPASS GRAFT ARTERY CORONARY N/A 4/23/2018    Procedure: CORONARY ARTERY BYPASS X5, LEFT LEG ENDOSCOPIC VEIN HARVEST, LEFT INTERNAL MAMMARY ARTERY, ANESTHESIA TRANSESOPHAGEAL ECHOCARDIOGRAM;  Surgeon: Gato Valdes MD;  Location: [facility] OR;  Service:     CV CORONARY ANGIOGRAM N/A 4/17/2018    Procedure: Coronary Angiogram;  Surgeon: Osiel Bazzi MD;  Location: [facility] Cath Lab;  Service:         Family History: History reviewed. No pertinent family history.     Social History:   Social History     Socioeconomic History    Marital status: Single     Spouse name: Not on file    Number of children: Not on file    Years of education: Not on file    Highest education level: Not on file   Occupational History    Not on file   Tobacco Use    Smoking status: Former    Smokeless tobacco: Former    Tobacco comments:     states goes through a tin in one month   Vaping Use    Vaping Use: Never used   Substance and Sexual Activity    Alcohol use: " No    Drug use: No    Sexual activity: Not on file   Other Topics Concern    Not on file   Social History Narrative    Not on file     Social Determinants of Health     Financial Resource Strain: Not on file   Food Insecurity: Not on file   Transportation Needs: Not on file   Physical Activity: Not on file   Stress: Not on file   Social Connections: Not on file   Interpersonal Safety: Not on file   Housing Stability: Not on file            Lab Results    Chemistry/lipid CBC Cardiac Enzymes/BNP/TSH/INR   Lab Results   Component Value Date    CHOL 114 11/15/2023    HDL 37 (L) 11/15/2023    TRIG 44 11/15/2023    BUN 18.5 11/15/2023     11/15/2023    CO2 24 11/15/2023    Lab Results   Component Value Date    WBC 8.2 04/26/2018    HGB 11.4 (L) 05/04/2018    HCT 28.1 (L) 04/26/2018    MCV 89 04/26/2018     04/27/2018    Lab Results   Component Value Date    TROPONINI 17.08 (HH) 04/17/2018    INR 1.23 (H) 04/24/2018                Thank you for allowing me to participate in the care of your patient.      Sincerely,     Dennis Calhoun MD     [facility]  cc:   Dennis Calhoun MD  [address]  [place] [phone]

## 2024-01-31 NOTE — PROGRESS NOTES
[facility]   [address]  [place] [address]   [email or web address]   OFFICE: [phone]     CARDIOLOGY CLINIC NOTE     Thank you, Rebeca Hope, for asking the [facility] team to see Mr. Chris Redd to evaluate Follow Up (Coronary artery disease)        Assessment/Recommendations   Assessment:    Coronary artery disease with prior 5v CABG (SVG to rPDA, SVG to L LEISA, radial artery to OM, SVG to diagonal branch, and LIMA to LAD - stable without angina.  NSVT in the setting of NSTEMI - no known recurrence since 2018  Hypertension - high today. Generally controlled when he checks it at home.  Hyperlipidemia -  Controlled on atorvastatin 20    Plan:  Lifestyle modifications for BP. If BP remains elevated would start losartan  Continue other medications without changes. Aspirin for CAD and atorvastatin for lipid management  Advised regular exercise  Follow up with me in a year or sooner if needed         History of Present Illness   Mr. Chris Redd is a 68 year old male who presents for follow-up.     Reese has a history of coronary artery disease with prior 5 vessel bypass surgery in 2018.  He also had nonsustained ventricular tachycardia in the setting of his presenting NSTEMI prior to his surgery.     Reese is feeling generally well today. He has no new cardiac symptoms. Not exercising regularly.     Other than noted above, Mr. Redd denies any chest pain/pressure/tightness, shortness of breath at rest or with exertion, light headedness/dizziness, pre-syncope, syncope, lower extremity swelling, palpitations, paroxysmal nocturnal dyspnea (PND), or orthopnea.     Cardiac Problems and Cardiac Diagnostics     Most Recent Cardiac testing:  ECG dated 4/24/18 (personaly reviewed and interpreted): sinus rhythm with possible lateral infarct.    ECHO (report reviewed):   TTE 4/17/18  1. The left ventricle is normal in size. Left  "ventricular systolic performance is at the lower limits of normal. The ejection fraction is estimated to be 50%.    2. On selected views, there is mild relative hypokinesis of the posterolateral wall.   3. There is mild concentric increase in left ventricular wall thickness.  4. No significant valvular heart disease is identified on this study.   5. Normal right ventricular size and systolic performance.      Cardiac cath: 4/17/18  Estimated blood loss was <20 ml.  Mid RCA lesion 100% stenosed.  Prox RCA to Mid RCA lesion 40% stenosed.  Prox LAD lesion 50% stenosed.  Dist Cx lesion 90% stenosed.  Prox Cx lesion 40% stenosed.  Mid Cx lesion 30% stenosed.  Ost LAD to Prox LAD lesion 35% stenosed.  Ost 2nd Mrg-1 lesion 60% stenosed.  Ost 2nd Mrg-2 lesion 99% stenosed.  Ost 1st Mrg lesion 40% stenosed.         Medications  Allergies   Current Outpatient Medications   Medication Sig Dispense Refill    ascorbic acid, vitamin C, (ASCORBIC ACID WITH JUNIOR HIPS) 500 MG tablet [ASCORBIC ACID, VITAMIN C, (ASCORBIC ACID WITH JUNIOR HIPS) 500 MG TABLET] Take 500 mg by mouth daily.      aspirin 81 mg chewable tablet [ASPIRIN 81 MG CHEWABLE TABLET] Chew 1 tablet (81 mg total) daily.  0    atorvastatin (LIPITOR) 20 MG tablet [ATORVASTATIN (LIPITOR) 20 MG TABLET] Take 1 tablet (20 mg total) by mouth daily. 30 tablet 3    b complex vitamins tablet [B COMPLEX VITAMINS TABLET] Take 1 tablet by mouth daily.      MULTIVIT-MINERALS/FERROUS FUM (MULTI VITAMIN ORAL) [MULTIVIT-MINERALS/FERROUS FUM (MULTI VITAMIN ORAL)] Take 1 tablet by mouth daily. Takes antioxidant formula antioxidant multiple vitamin and minerals table orally daily        Allergies   Allergen Reactions    Sulfamethizole Unknown     Doesn't remember reaction. Happened at age 8 years old.         Physical Examination Review of Systems   Vitals: BP (!) 150/90   Pulse 64   Ht 1.905 m (6' 3\")   Wt 104.9 kg (231 lb 3.2 oz)   SpO2 (!) 16%   BMI 28.90 kg/m    BMI= Body mass " index is 28.9 kg/m .  Wt Readings from Last 3 Encounters:   01/31/24 104.9 kg (231 lb 3.2 oz)   02/03/23 103.4 kg (228 lb)   02/22/22 107 kg (236 lb)       General: pleasant male. No acute distress.   HENT: external ears normal. Nares patent. Mucous membranes moist.  Eyes: perrla, extraocular muscles intact. No scleral icterus.   Neck: No JVD  Lungs: clear to auscultation  COR: regular rate and rhythm, No murmurs, rubs, or gallops  Abd: nondistended, soft  Extrem: No edema        Please refer above for cardiac ROS details.       Past History   Past Medical History:   Past Medical History:   Diagnosis Date    Family history of myocardial infarction     pt adopted; unknown        Past Surgical History:   Past Surgical History:   Procedure Laterality Date    BYPASS GRAFT ARTERY CORONARY N/A 4/23/2018    Procedure: CORONARY ARTERY BYPASS X5, LEFT LEG ENDOSCOPIC VEIN HARVEST, LEFT INTERNAL MAMMARY ARTERY, ANESTHESIA TRANSESOPHAGEAL ECHOCARDIOGRAM;  Surgeon: Gato Valdes MD;  Location: [facility] OR;  Service:     CV CORONARY ANGIOGRAM N/A 4/17/2018    Procedure: Coronary Angiogram;  Surgeon: Osiel Bazzi MD;  Location: [facility] Cath Lab;  Service:         Family History: History reviewed. No pertinent family history.     Social History:   Social History     Socioeconomic History    Marital status: Single     Spouse name: Not on file    Number of children: Not on file    Years of education: Not on file    Highest education level: Not on file   Occupational History    Not on file   Tobacco Use    Smoking status: Former    Smokeless tobacco: Former    Tobacco comments:     states goes through a tin in one month   Vaping Use    Vaping Use: Never used   Substance and Sexual Activity    Alcohol use: No    Drug use: No    Sexual activity: Not on file   Other Topics Concern    Not on file   Social History Narrative    Not on file     Social Determinants of Health     Financial Resource Strain: Not on file    Food Insecurity: Not on file   Transportation Needs: Not on file   Physical Activity: Not on file   Stress: Not on file   Social Connections: Not on file   Interpersonal Safety: Not on file   Housing Stability: Not on file            Lab Results    Chemistry/lipid CBC Cardiac Enzymes/BNP/TSH/INR   Lab Results   Component Value Date    CHOL 114 11/15/2023    HDL 37 (L) 11/15/2023    TRIG 44 11/15/2023    BUN 18.5 11/15/2023     11/15/2023    CO2 24 11/15/2023    Lab Results   Component Value Date    WBC 8.2 04/26/2018    HGB 11.4 (L) 05/04/2018    HCT 28.1 (L) 04/26/2018    MCV 89 04/26/2018     04/27/2018    Lab Results   Component Value Date    TROPONINI 17.08 (HH) 04/17/2018    INR 1.23 (H) 04/24/2018

## 2024-02-16 ENCOUNTER — TELEPHONE (OUTPATIENT)
Dept: CARDIOLOGY | Facility: CLINIC | Age: 69
End: 2024-02-16
Payer: COMMERCIAL

## 2024-02-16 NOTE — TELEPHONE ENCOUNTER
[place] Community Measures Blood Pressure guideline reviewed.  Patients recent blood pressure is outside of guideline parameters.  Called pt to review, no answer.  Unable to leave message, voicemail is full.

## 2024-11-19 ENCOUNTER — LAB REQUISITION (OUTPATIENT)
Dept: LAB | Facility: CLINIC | Age: 69
End: 2024-11-19
Payer: COMMERCIAL

## 2024-11-19 ENCOUNTER — TRANSFERRED RECORDS (OUTPATIENT)
Dept: HEALTH INFORMATION MANAGEMENT | Facility: CLINIC | Age: 69
End: 2024-11-19

## 2024-11-19 DIAGNOSIS — I10 ESSENTIAL (PRIMARY) HYPERTENSION: ICD-10-CM

## 2024-11-19 DIAGNOSIS — Z12.5 ENCOUNTER FOR SCREENING FOR MALIGNANT NEOPLASM OF PROSTATE: ICD-10-CM

## 2024-11-19 DIAGNOSIS — E78.2 MIXED HYPERLIPIDEMIA: ICD-10-CM

## 2024-11-19 LAB
ALBUMIN SERPL BCG-MCNC: 4.1 G/DL (ref 3.5–5.2)
ALP SERPL-CCNC: 77 U/L (ref 40–150)
ALT SERPL W P-5'-P-CCNC: 29 U/L (ref 0–70)
ANION GAP SERPL CALCULATED.3IONS-SCNC: 9 MMOL/L (ref 7–15)
AST SERPL W P-5'-P-CCNC: 28 U/L (ref 0–45)
BILIRUB SERPL-MCNC: 0.9 MG/DL
BUN SERPL-MCNC: 18.5 MG/DL (ref 8–23)
CALCIUM SERPL-MCNC: 9.4 MG/DL (ref 8.8–10.4)
CHLORIDE SERPL-SCNC: 106 MMOL/L (ref 98–107)
CHOLEST SERPL-MCNC: 140 MG/DL
CREAT SERPL-MCNC: 0.77 MG/DL (ref 0.67–1.17)
EGFRCR SERPLBLD CKD-EPI 2021: >90 ML/MIN/1.73M2
FASTING STATUS PATIENT QL REPORTED: NORMAL
FASTING STATUS PATIENT QL REPORTED: NORMAL
GLUCOSE SERPL-MCNC: 97 MG/DL (ref 70–99)
HCO3 SERPL-SCNC: 26 MMOL/L (ref 22–29)
HDLC SERPL-MCNC: 41 MG/DL
LDLC SERPL CALC-MCNC: 83 MG/DL
NONHDLC SERPL-MCNC: 99 MG/DL
POTASSIUM SERPL-SCNC: 4.6 MMOL/L (ref 3.4–5.3)
PROT SERPL-MCNC: 6.4 G/DL (ref 6.4–8.3)
PSA SERPL DL<=0.01 NG/ML-MCNC: 0.67 NG/ML (ref 0–4.5)
SODIUM SERPL-SCNC: 141 MMOL/L (ref 135–145)
TRIGL SERPL-MCNC: 80 MG/DL

## 2024-12-08 ENCOUNTER — HEALTH MAINTENANCE LETTER (OUTPATIENT)
Age: 69
End: 2024-12-08

## 2025-01-27 ENCOUNTER — OFFICE VISIT (OUTPATIENT)
Dept: CARDIOLOGY | Facility: CLINIC | Age: 70
End: 2025-01-27
Attending: INTERNAL MEDICINE
Payer: COMMERCIAL

## 2025-01-27 VITALS
SYSTOLIC BLOOD PRESSURE: 136 MMHG | HEART RATE: 67 BPM | BODY MASS INDEX: 27.98 KG/M2 | WEIGHT: 225 LBS | RESPIRATION RATE: 16 BRPM | DIASTOLIC BLOOD PRESSURE: 80 MMHG | HEIGHT: 75 IN

## 2025-01-27 DIAGNOSIS — I25.10 CORONARY ARTERY DISEASE INVOLVING NATIVE CORONARY ARTERY OF NATIVE HEART WITHOUT ANGINA PECTORIS: ICD-10-CM

## 2025-01-27 DIAGNOSIS — I10 ESSENTIAL HYPERTENSION: ICD-10-CM

## 2025-01-27 DIAGNOSIS — E78.2 MIXED HYPERLIPIDEMIA: ICD-10-CM

## 2025-01-27 DIAGNOSIS — I47.29 NSVT (NONSUSTAINED VENTRICULAR TACHYCARDIA) (H): ICD-10-CM

## 2025-01-27 DIAGNOSIS — Z95.1 HX OF CABG: ICD-10-CM

## 2025-01-27 PROBLEM — J96.00 ARF (ACUTE RESPIRATORY FAILURE) (H): Status: RESOLVED | Noted: 2018-04-23 | Resolved: 2025-01-27

## 2025-01-27 PROCEDURE — G2211 COMPLEX E/M VISIT ADD ON: HCPCS | Performed by: INTERNAL MEDICINE

## 2025-01-27 PROCEDURE — 99214 OFFICE O/P EST MOD 30 MIN: CPT | Performed by: INTERNAL MEDICINE

## 2025-01-27 NOTE — PATIENT INSTRUCTIONS
"It was a pleasure to meet with you today.      Below is a summary of your visit.   Continue your medications without changes.  Regular exercise is the most important thing you can do to remain healthy as you age. Consider reading or listening to the book \"Outlive: the Art and Science of Longevity\" by Dr. Charles Sepulveda. Dr. Sepulveda also has a podcast called \"The Drive\".  Follow up in a year or sooner if needed.    Exercise:  Recommendation: Regular exercise with a combination of aerobic conditioning and strength/resistance training.   -Goal for 30 to 60 minutes of moderate-intensity combined aerobic activity and resistance training, 7 days per week (minimum 5 days per week)  -Start at a duration and intensity of exercise that is comfortable for you.  -Increase by 5 minutes every other week until goal is reached  -Eventually, the intensity of aerobic exercise should be at a level at which it is difficult to carry on a normal conversation    Please do not hesitate to call the [facility] with any questions or concerns at [phone]. You can also reach my nurse, Yisel, at [phone].    Sincerely,       "

## 2025-01-27 NOTE — PROGRESS NOTES
[facility]   [address]  [place] [address]   [email or web address]   OFFICE: [phone]     CARDIOLOGY CLINIC NOTE     Thank you, Rebeca Hope, for asking the [facility] team to see Mr. Chris Redd to evaluate Follow Up        Assessment/Recommendations   Assessment:    Coronary artery disease with prior 5v CABG (SVG to rPDA, SVG to L LEISA, radial artery to OM, SVG to diagonal branch, and LIMA to LAD - stable without angina.  NSVT in the setting of NSTEMI - no known recurrence since 2018  Hypertension - high today. Generally controlled when he checks it at home.  Hyperlipidemia -  Controlled on atorvastatin 20    Plan:  Lifestyle modifications for BP. If BP remains elevated would start losartan   Continue other medications without changes. Aspirin for CAD and atorvastatin for lipid management  Advised regular exercise  Follow up with me in a year or sooner if needed    The longitudinal plan of care for the diagnosis(es)/condition(s) as documented were addressed during this visit. Due to the added complexity in care, I will continue to support Reese in the subsequent management and with ongoing continuity of care.         History of Present Illness   Mr. Chris Redd is a 69 year old male who presents for follow-up.     Reese has a history of coronary artery disease with prior 5 vessel bypass surgery in 2018.  He also had nonsustained ventricular tachycardia in the setting of his presenting NSTEMI prior to his surgery.     Reese is feeling generally well today. He has no new cardiac symptoms. He is getting some intermittent exercise, but not much. Tolerating medications without side effects.    Other than noted above, Mr. Redd denies any chest pain/pressure/tightness, shortness of breath at rest or with exertion, light headedness/dizziness, pre-syncope, syncope, lower extremity swelling, palpitations, paroxysmal nocturnal  dyspnea (PND), or orthopnea.     Cardiac Problems and Cardiac Diagnostics     Most Recent Cardiac testing:  ECG dated 4/24/18 (personaly reviewed and interpreted): sinus rhythm with possible lateral infarct.    ECHO (report reviewed):   TTE 4/17/18  1. The left ventricle is normal in size. Left ventricular systolic performance is at the lower limits of normal. The ejection fraction is estimated to be 50%.    2. On selected views, there is mild relative hypokinesis of the posterolateral wall.   3. There is mild concentric increase in left ventricular wall thickness.  4. No significant valvular heart disease is identified on this study.   5. Normal right ventricular size and systolic performance.      Cardiac cath: 4/17/18  Estimated blood loss was <20 ml.  Mid RCA lesion 100% stenosed.  Prox RCA to Mid RCA lesion 40% stenosed.  Prox LAD lesion 50% stenosed.  Dist Cx lesion 90% stenosed.  Prox Cx lesion 40% stenosed.  Mid Cx lesion 30% stenosed.  Ost LAD to Prox LAD lesion 35% stenosed.  Ost 2nd Mrg-1 lesion 60% stenosed.  Ost 2nd Mrg-2 lesion 99% stenosed.  Ost 1st Mrg lesion 40% stenosed.         Medications  Allergies   Current Outpatient Medications   Medication Sig Dispense Refill    ascorbic acid, vitamin C, (ASCORBIC ACID WITH JUNIOR HIPS) 500 MG tablet [ASCORBIC ACID, VITAMIN C, (ASCORBIC ACID WITH JUNIOR HIPS) 500 MG TABLET] Take 500 mg by mouth daily.      aspirin 81 mg chewable tablet [ASPIRIN 81 MG CHEWABLE TABLET] Chew 1 tablet (81 mg total) daily.  0    atorvastatin (LIPITOR) 20 MG tablet [ATORVASTATIN (LIPITOR) 20 MG TABLET] Take 1 tablet (20 mg total) by mouth daily. 30 tablet 3    b complex vitamins tablet [B COMPLEX VITAMINS TABLET] Take 1 tablet by mouth daily.      MULTIVIT-MINERALS/FERROUS FUM (MULTI VITAMIN ORAL) [MULTIVIT-MINERALS/FERROUS FUM (MULTI VITAMIN ORAL)] Take 1 tablet by mouth daily. Takes antioxidant formula antioxidant multiple vitamin and minerals table orally daily        Allergies  "  Allergen Reactions    Sulfamethizole Unknown     Doesn't remember reaction. Happened at age 8 years old.         Physical Examination Review of Systems   Vitals: BP (!) 144/86 (BP Location: Right arm, Patient Position: Sitting, Cuff Size: Adult Large)   Pulse 67   Resp 16   Ht 1.905 m (6' 3\")   Wt 102.1 kg (225 lb)   BMI 28.12 kg/m    BMI= Body mass index is 28.12 kg/m .  Wt Readings from Last 3 Encounters:   01/27/25 102.1 kg (225 lb)   01/31/24 104.9 kg (231 lb 3.2 oz)   02/03/23 103.4 kg (228 lb)       General: pleasant male. No acute distress.   Neck: No JVD  Lungs: clear to auscultation  COR: regular rate and rhythm, No murmurs, rubs, or gallops  Extrem: No edema        Please refer above for cardiac ROS details.       Past History   Past Medical History:   Past Medical History:   Diagnosis Date    Family history of myocardial infarction     pt adopted; unknown        Past Surgical History:   Past Surgical History:   Procedure Laterality Date    BYPASS GRAFT ARTERY CORONARY N/A 4/23/2018    Procedure: CORONARY ARTERY BYPASS X5, LEFT LEG ENDOSCOPIC VEIN HARVEST, LEFT INTERNAL MAMMARY ARTERY, ANESTHESIA TRANSESOPHAGEAL ECHOCARDIOGRAM;  Surgeon: Gato Valdes MD;  Location: [facility] OR;  Service:     CV CORONARY ANGIOGRAM N/A 4/17/2018    Procedure: Coronary Angiogram;  Surgeon: Osiel Bazzi MD;  Location: [facility] Cath Lab;  Service:         Family History: No family history on file.     Social History:   Social History     Socioeconomic History    Marital status: Single     Spouse name: Not on file    Number of children: Not on file    Years of education: Not on file    Highest education level: Not on file   Occupational History    Not on file   Tobacco Use    Smoking status: Former    Smokeless tobacco: Former    Tobacco comments:     states goes through a tin in one month   Vaping Use    Vaping status: Never Used   Substance and Sexual Activity    Alcohol use: No    Drug use: No "    Sexual activity: Not on file   Other Topics Concern    Not on file   Social History Narrative    Not on file     Social Drivers of Health     Financial Resource Strain: Not on file   Food Insecurity: Not on file   Transportation Needs: Not on file   Physical Activity: Not on file   Stress: Not on file (2/11/2021)   Social Connections: Not on file   Interpersonal Safety: Low Risk  (4/13/2021)    [facility]    Intimate Partner Violence     Insults You: Not on file     Threatens You: Not on file     Screams at You: Not on file     Physically Hurt: Not on file     Intimate Partner Violence Score: Not on file   Housing Stability: Not on file            Lab Results    Chemistry/lipid CBC Cardiac Enzymes/BNP/TSH/INR   Lab Results   Component Value Date    CHOL 140 11/19/2024    HDL 41 11/19/2024    TRIG 80 11/19/2024    BUN 18.5 11/19/2024     11/19/2024    CO2 26 11/19/2024    Lab Results   Component Value Date    WBC 8.2 04/26/2018    HGB 11.4 (L) 05/04/2018    HCT 28.1 (L) 04/26/2018    MCV 89 04/26/2018     04/27/2018    Lab Results   Component Value Date    TROPONINI 17.08 (HH) 04/17/2018    INR 1.23 (H) 04/24/2018

## 2025-01-27 NOTE — LETTER
1/27/2025    Rebeca Galvin, NACHO CNP  [address]  [facility] [address]    RE: Chris CALLEJAS Tramaine       Dear Colleague,     I had the pleasure of seeing Chris Redd in the [facility].    [facility]   [address]  [place] [address]   [email or web address]   OFFICE: [phone]     CARDIOLOGY CLINIC NOTE     Thank you, Rebeca Hope, for asking the [facility] team to see Mr. Chris Redd to evaluate Follow Up        Assessment/Recommendations   Assessment:    Coronary artery disease with prior 5v CABG (SVG to rPDA, SVG to L LEISA, radial artery to OM, SVG to diagonal branch, and LIMA to LAD - stable without angina.  NSVT in the setting of NSTEMI - no known recurrence since 2018  Hypertension - high today. Generally controlled when he checks it at home.  Hyperlipidemia -  Controlled on atorvastatin 20    Plan:  Lifestyle modifications for BP. If BP remains elevated would start losartan   Continue other medications without changes. Aspirin for CAD and atorvastatin for lipid management  Advised regular exercise  Follow up with me in a year or sooner if needed    The longitudinal plan of care for the diagnosis(es)/condition(s) as documented were addressed during this visit. Due to the added complexity in care, I will continue to support Reese in the subsequent management and with ongoing continuity of care.         History of Present Illness   Mr. Chris Redd is a 69 year old male who presents for follow-up.     Reese has a history of coronary artery disease with prior 5 vessel bypass surgery in 2018.  He also had nonsustained ventricular tachycardia in the setting of his presenting NSTEMI prior to his surgery.     Reese is feeling generally well today. He has no new cardiac symptoms. He is getting some intermittent exercise, but not much. Tolerating medications without side effects.    Other than noted  above, Mr. Redd denies any chest pain/pressure/tightness, shortness of breath at rest or with exertion, light headedness/dizziness, pre-syncope, syncope, lower extremity swelling, palpitations, paroxysmal nocturnal dyspnea (PND), or orthopnea.     Cardiac Problems and Cardiac Diagnostics     Most Recent Cardiac testing:  ECG dated 4/24/18 (personaly reviewed and interpreted): sinus rhythm with possible lateral infarct.    ECHO (report reviewed):   TTE 4/17/18  1. The left ventricle is normal in size. Left ventricular systolic performance is at the lower limits of normal. The ejection fraction is estimated to be 50%.    2. On selected views, there is mild relative hypokinesis of the posterolateral wall.   3. There is mild concentric increase in left ventricular wall thickness.  4. No significant valvular heart disease is identified on this study.   5. Normal right ventricular size and systolic performance.      Cardiac cath: 4/17/18  Estimated blood loss was <20 ml.  Mid RCA lesion 100% stenosed.  Prox RCA to Mid RCA lesion 40% stenosed.  Prox LAD lesion 50% stenosed.  Dist Cx lesion 90% stenosed.  Prox Cx lesion 40% stenosed.  Mid Cx lesion 30% stenosed.  Ost LAD to Prox LAD lesion 35% stenosed.  Ost 2nd Mrg-1 lesion 60% stenosed.  Ost 2nd Mrg-2 lesion 99% stenosed.  Ost 1st Mrg lesion 40% stenosed.         Medications  Allergies   Current Outpatient Medications   Medication Sig Dispense Refill     ascorbic acid, vitamin C, (ASCORBIC ACID WITH JUNIOR HIPS) 500 MG tablet [ASCORBIC ACID, VITAMIN C, (ASCORBIC ACID WITH JUNIOR HIPS) 500 MG TABLET] Take 500 mg by mouth daily.       aspirin 81 mg chewable tablet [ASPIRIN 81 MG CHEWABLE TABLET] Chew 1 tablet (81 mg total) daily.  0     atorvastatin (LIPITOR) 20 MG tablet [ATORVASTATIN (LIPITOR) 20 MG TABLET] Take 1 tablet (20 mg total) by mouth daily. 30 tablet 3     b complex vitamins tablet [B COMPLEX VITAMINS TABLET] Take 1 tablet by mouth daily.        "MULTIVIT-MINERALS/FERROUS FUM (MULTI VITAMIN ORAL) [MULTIVIT-MINERALS/FERROUS FUM (MULTI VITAMIN ORAL)] Take 1 tablet by mouth daily. Takes antioxidant formula antioxidant multiple vitamin and minerals table orally daily        Allergies   Allergen Reactions     Sulfamethizole Unknown     Doesn't remember reaction. Happened at age 8 years old.         Physical Examination Review of Systems   Vitals: BP (!) 144/86 (BP Location: Right arm, Patient Position: Sitting, Cuff Size: Adult Large)   Pulse 67   Resp 16   Ht 1.905 m (6' 3\")   Wt 102.1 kg (225 lb)   BMI 28.12 kg/m    BMI= Body mass index is 28.12 kg/m .  Wt Readings from Last 3 Encounters:   01/27/25 102.1 kg (225 lb)   01/31/24 104.9 kg (231 lb 3.2 oz)   02/03/23 103.4 kg (228 lb)       General: pleasant male. No acute distress.   Neck: No JVD  Lungs: clear to auscultation  COR: regular rate and rhythm, No murmurs, rubs, or gallops  Extrem: No edema        Please refer above for cardiac ROS details.       Past History   Past Medical History:   Past Medical History:   Diagnosis Date     Family history of myocardial infarction     pt adopted; unknown        Past Surgical History:   Past Surgical History:   Procedure Laterality Date     BYPASS GRAFT ARTERY CORONARY N/A 4/23/2018    Procedure: CORONARY ARTERY BYPASS X5, LEFT LEG ENDOSCOPIC VEIN HARVEST, LEFT INTERNAL MAMMARY ARTERY, ANESTHESIA TRANSESOPHAGEAL ECHOCARDIOGRAM;  Surgeon: Gato Valdes MD;  Location: [facility] OR;  Service:      CV CORONARY ANGIOGRAM N/A 4/17/2018    Procedure: Coronary Angiogram;  Surgeon: Osiel Bazzi MD;  Location: [facility] Cath Lab;  Service:         Family History: No family history on file.     Social History:   Social History     Socioeconomic History     Marital status: Single     Spouse name: Not on file     Number of children: Not on file     Years of education: Not on file     Highest education level: Not on file   Occupational History     Not on " file   Tobacco Use     Smoking status: Former     Smokeless tobacco: Former     Tobacco comments:     states goes through a tin in one month   Vaping Use     Vaping status: Never Used   Substance and Sexual Activity     Alcohol use: No     Drug use: No     Sexual activity: Not on file   Other Topics Concern     Not on file   Social History Narrative     Not on file     Social Drivers of Health     Financial Resource Strain: Not on file   Food Insecurity: Not on file   Transportation Needs: Not on file   Physical Activity: Not on file   Stress: Not on file (2/11/2021)   Social Connections: Not on file   Interpersonal Safety: Low Risk  (4/13/2021)    [facility] [employer] [employer] [facility]    Intimate Partner Violence      Insults You: Not on file      Threatens You: Not on file      Screams at You: Not on file      Physically Hurt: Not on file      Intimate Partner Violence Score: Not on file   Housing Stability: Not on file            Lab Results    Chemistry/lipid CBC Cardiac Enzymes/BNP/TSH/INR   Lab Results   Component Value Date    CHOL 140 11/19/2024    HDL 41 11/19/2024    TRIG 80 11/19/2024    BUN 18.5 11/19/2024     11/19/2024    CO2 26 11/19/2024    Lab Results   Component Value Date    WBC 8.2 04/26/2018    HGB 11.4 (L) 05/04/2018    HCT 28.1 (L) 04/26/2018    MCV 89 04/26/2018     04/27/2018    Lab Results   Component Value Date    TROPONINI 17.08 (HH) 04/17/2018    INR 1.23 (H) 04/24/2018                Thank you for allowing me to participate in the care of your patient.      Sincerely,     Dennis Calhoun MD     [facility]  cc:   Dennis Calhoun MD  [address], [address]  [place] [address]